# Patient Record
Sex: MALE | Race: WHITE | NOT HISPANIC OR LATINO | Employment: OTHER | URBAN - NONMETROPOLITAN AREA
[De-identification: names, ages, dates, MRNs, and addresses within clinical notes are randomized per-mention and may not be internally consistent; named-entity substitution may affect disease eponyms.]

---

## 2018-06-13 ENCOUNTER — APPOINTMENT (OUTPATIENT)
Dept: LAB | Facility: HOSPITAL | Age: 63
End: 2018-06-13
Attending: INTERNAL MEDICINE

## 2018-06-13 ENCOUNTER — OFFICE VISIT (OUTPATIENT)
Dept: CARDIOLOGY | Facility: CLINIC | Age: 63
End: 2018-06-13

## 2018-06-13 VITALS
BODY MASS INDEX: 26.83 KG/M2 | HEART RATE: 51 BPM | OXYGEN SATURATION: 99 % | SYSTOLIC BLOOD PRESSURE: 132 MMHG | DIASTOLIC BLOOD PRESSURE: 76 MMHG | HEIGHT: 70 IN | WEIGHT: 187.4 LBS

## 2018-06-13 DIAGNOSIS — R06.02 SHORTNESS OF BREATH: ICD-10-CM

## 2018-06-13 DIAGNOSIS — I10 ESSENTIAL HYPERTENSION: ICD-10-CM

## 2018-06-13 DIAGNOSIS — R63.4 WEIGHT LOSS: ICD-10-CM

## 2018-06-13 DIAGNOSIS — R00.1 BRADYCARDIA: ICD-10-CM

## 2018-06-13 DIAGNOSIS — R00.2 PALPITATION: ICD-10-CM

## 2018-06-13 DIAGNOSIS — R55 SYNCOPE, UNSPECIFIED SYNCOPE TYPE: ICD-10-CM

## 2018-06-13 DIAGNOSIS — R53.83 OTHER FATIGUE: ICD-10-CM

## 2018-06-13 DIAGNOSIS — R07.2 PRECORDIAL PAIN: Primary | ICD-10-CM

## 2018-06-13 PROCEDURE — 84479 ASSAY OF THYROID (T3 OR T4): CPT | Performed by: INTERNAL MEDICINE

## 2018-06-13 PROCEDURE — 99205 OFFICE O/P NEW HI 60 MIN: CPT | Performed by: INTERNAL MEDICINE

## 2018-06-13 PROCEDURE — 93000 ELECTROCARDIOGRAM COMPLETE: CPT | Performed by: INTERNAL MEDICINE

## 2018-06-13 PROCEDURE — 84443 ASSAY THYROID STIM HORMONE: CPT | Performed by: INTERNAL MEDICINE

## 2018-06-13 PROCEDURE — 80048 BASIC METABOLIC PNL TOTAL CA: CPT | Performed by: INTERNAL MEDICINE

## 2018-06-13 PROCEDURE — 84436 ASSAY OF TOTAL THYROXINE: CPT | Performed by: INTERNAL MEDICINE

## 2018-06-13 RX ORDER — NITROGLYCERIN 0.4 MG/1
TABLET SUBLINGUAL
Qty: 25 TABLET | Refills: 6 | Status: SHIPPED | OUTPATIENT
Start: 2018-06-13

## 2018-06-13 RX ORDER — ATORVASTATIN CALCIUM 40 MG/1
40 TABLET, FILM COATED ORAL DAILY
Qty: 30 TABLET | Refills: 11 | Status: SHIPPED | OUTPATIENT
Start: 2018-06-13 | End: 2022-04-14

## 2018-06-13 RX ORDER — AMLODIPINE BESYLATE 2.5 MG/1
2.5 TABLET ORAL DAILY
Qty: 30 TABLET | Refills: 6 | Status: SHIPPED | OUTPATIENT
Start: 2018-06-13 | End: 2018-08-08 | Stop reason: ALTCHOICE

## 2018-06-13 RX ORDER — ESCITALOPRAM OXALATE 20 MG/1
20 TABLET ORAL DAILY
Refills: 2 | COMMUNITY
Start: 2018-03-16

## 2018-06-13 RX ORDER — RANITIDINE 300 MG/1
300 TABLET ORAL 2 TIMES DAILY
Refills: 2 | COMMUNITY
Start: 2018-03-16 | End: 2018-08-08 | Stop reason: ALTCHOICE

## 2018-06-13 RX ORDER — LOSARTAN POTASSIUM 100 MG/1
100 TABLET ORAL DAILY
COMMUNITY
Start: 2018-06-12

## 2018-06-13 NOTE — PROGRESS NOTES
"Subjective   Alfredo Lindsey is a 62 y.o. male     Chief Complaint   Patient presents with   • Chest Pain     Presents to establish care.    • Shortness of Breath   • Palpitations   • Hypertension       PROBLEM LIST:     Specialty Problems     None            HPI: Problem List:  1. Chest Pain.  2. Shortness of Breath.  3. Hypertension.  4. Palpitations.  5. Fatigue.       Mr. Alfredo Lindsey is a 62-year-old white male referred by Dr. Mike Jackson for evaluation of chest pain.    Mr. Lindsey describes retrosternal and bilateral precordial chest \"tightness\".  Symptoms tend to occur with physical activity are accompanied by shortness of breath and a sense of air hunger, and tightness radiates into the left side of the neck and and to the left arm.  Dyspnea is reliably associated with these symptoms, occasionally the patient is diaphoretic and nauseated as well.  Symptoms last for several minutes to a half hour or more.  They are always self-limited resolving with rest although Mr. Lindsey states that he has had occasional symptoms not associated with physical activity.    Chest discomfort began 6-12 months ago and is becoming progressively more frequent although not necessarily more severe or precipitated by lower levels of physical activity over that time.  He describes episodes of orthopnea and PND over the same time.  Although he has not had nocturnal awakening of late.    Mr. Lindsey also describes syncope.  He has had at least one episode of orthostatic syncope, and one episode of post micturition syncope.  He has no syncope associated with abnormal heart rhythms or with chest pain.  He denies any symptoms of peripheral arterial disease or of arterial embolic events.  He does describe other worrisome symptoms however.  Mr. Lindsey states that he vomits airy frequently after eating only a single bite of food.  He lost 70 pounds over a 12 month period and is gaining 20 pounds back.  He relates that he has had " gross hematemesis as well as hematochezia.  He is scheduled for panendoscopy in the near future.  He also describes intermittent night sweats and generalized anergy, fatigue, and decreased exercise capacity.  CURRENT MEDICATION:    Current Outpatient Prescriptions   Medication Sig Dispense Refill   • escitalopram (LEXAPRO) 10 MG tablet Take 10 mg by mouth Daily.  2   • losartan (COZAAR) 100 MG tablet Take 100 mg by mouth Daily.     • raNITIdine (ZANTAC) 300 MG tablet Take 300 mg by mouth 2 (Two) Times a Day.  2     No current facility-administered medications for this visit.        ALLERGIES:    Patient has no known allergies.    PAST MEDICAL HISTORY:    History reviewed. No pertinent past medical history.    SURGICAL HISTORY:    History reviewed. No pertinent surgical history.    SOCIAL HISTORY:    Social History     Social History   • Marital status:      Spouse name: N/A   • Number of children: N/A   • Years of education: N/A     Occupational History   • Not on file.     Social History Main Topics   • Smoking status: Never Smoker   • Smokeless tobacco: Never Used   • Alcohol use No   • Drug use: No   • Sexual activity: Defer     Other Topics Concern   • Not on file     Social History Narrative   • No narrative on file       FAMILY HISTORY:    History reviewed. No pertinent family history.    Review of Systems   Constitutional: Positive for diaphoresis (Night Sweats) and fatigue (Onset 2 months ago- worsened). Negative for chills and fever.   HENT: Negative.    Eyes: Positive for visual disturbance (Wears glasses).   Respiratory: Positive for shortness of breath (With increased activity).    Cardiovascular: Positive for chest pain (Intermittent. Onset 1 month ago. Midsternal with radiation to left neck and back. ) and palpitations. Negative for leg swelling.   Gastrointestinal: Positive for abdominal pain, blood in stool (2 months ago. Seeing GI and scheduled for EGD and Colonoscopy on 7/20/18) and vomiting  "(Vomiting after eating). Negative for anal bleeding and nausea.   Endocrine: Negative.    Genitourinary: Negative.  Negative for hematuria.   Musculoskeletal: Positive for arthralgias (Chronic) and myalgias (To BLE).   Skin: Negative.    Allergic/Immunologic: Negative.    Neurological: Positive for dizziness (When standing), syncope (Last episode 2 months ago) and weakness (Generalized). Negative for light-headedness and headaches.   Hematological: Negative.  Does not bruise/bleed easily.   Psychiatric/Behavioral: Positive for sleep disturbance (Wakes frequently SOA throughout the night. Has to get up out of bed and sit under a fan to catch breath. ).       VISIT VITALS:  Vitals:    06/13/18 0927   BP: 132/76   BP Location: Left arm   Patient Position: Sitting   Pulse: 51   SpO2: 99%   Weight: 85 kg (187 lb 6.4 oz)   Height: 177.8 cm (70\")      /76 (BP Location: Left arm, Patient Position: Sitting)   Pulse 51   Ht 177.8 cm (70\")   Wt 85 kg (187 lb 6.4 oz)   SpO2 99%   BMI 26.89 kg/m²     RECENT LABS:    Objective       Physical Exam   Constitutional: He appears well-developed and well-nourished. He is cooperative.   HENT:   Head: Normocephalic and atraumatic.   Eyes: Conjunctivae, EOM and lids are normal. Pupils are equal, round, and reactive to light.   Mild Lid Lag.    Myosis.   Neck: Normal range of motion and full passive range of motion without pain. Neck supple. Normal carotid pulses, no hepatojugular reflux and no JVD present. Carotid bruit is not present. No thyromegaly present.   Cardiovascular: Regular rhythm, S1 normal, S2 normal, intact distal pulses and normal pulses.  Bradycardia present.  Exam reveals gallop and S4 (Soft). Exam reveals no friction rub.    No murmur heard.  Pulses:       Carotid pulses are 2+ on the right side, and 2+ on the left side.       Radial pulses are 2+ on the right side, and 2+ on the left side.        Dorsalis pedis pulses are 2+ on the right side, and 2+ on the " left side.        Posterior tibial pulses are 2+ on the right side, and 2+ on the left side.   Pulmonary/Chest: Effort normal and breath sounds normal. No respiratory distress.   Normal Expiratory Phase.    Abdominal: Soft. Normal appearance and bowel sounds are normal. He exhibits no distension, no abdominal bruit and no mass. There is no hepatomegaly. There is tenderness in the left upper quadrant.   Negative Organomegally   Musculoskeletal: Normal range of motion. He exhibits no edema.   Neurological: He is alert.   Skin: Skin is warm, dry and intact.   Nursing note and vitals reviewed.        ECG 12 Lead  Date/Time: 6/13/2018 9:37 AM  Performed by: JEREMY PERALTA  Authorized by: JEREMY PERALTA   Rhythm: sinus bradycardia  Comments: NST              Assessment/Plan   #1.  Chest pain.  The patient describes features compatible with ischemia which have been gradually progressive over at least 3-6 months.  Empiric therapy and expedited diagnosis of therefore indicated.    #2.  Therefore, we will schedule the patient for a treadmill Cardiolite stress testing and echocardiogram on an expedited basis as for ischemia nonischemic causes of chest discomfort.    #3.  I would like Mr. Lindsey to wear a 30 day event monitor to exclude a dysrhythmic cause of his syncope, and also to look for chronotropic incompetence (which should also be evaluated by treadmill stress testing) as a cause of his increased exertional dyspnea decreased exercise capacity and overall fatigue.    #4.  To further evaluate symptoms as described above, we will obtain a 24-hour urine for 5 prime HIAA as several of the patient's GI symptoms are suggestive of carcinoid syndrome.  Check thyroid function studies and basic metabolic panel.    #5.  Empirically, we will start atorvastatin 40 mg daily, add amlodipine 2.5 mg daily (the patient was given precautions reference worsening of orthostatic lightheadedness and edema) as I don't think that beta  blockers a viable option given his resting bradycardia.  Mr. Lindsey will also be given a prescription for sublingual nitroglycerin and he was given instructions in its use.  The patient was also given instructions to report immediately to the emergency room are activated emergency medical services for any chest discomfort not rapidly relieved by nitroglycerin.    #6.  César will follow-up for panendoscopy and with Dr. Jackson as instructed, and in our office immediately after testing or on a when necessary basis as discussed in detail with the patient today.  No diagnosis found.    No Follow-up on file.              Patient's Body mass index is 26.89 kg/m². BMI is above normal parameters. Recommendations include: educational material.       Emely Lopez LPN      Scribed for Dr. Alfredo Mcclellan by Emely Lopez LPN June 13, 2018 10:11 AM   Electronically signed by:            This note is dictated utilizing voice recognition software.  Although this record has been proof read, transcriptional errors may still be present. If questions occur regarding the content of this record please do not hesitate to call our office.

## 2018-06-14 LAB
ANION GAP SERPL CALCULATED.3IONS-SCNC: 5.8 MMOL/L (ref 3.6–11.2)
BUN BLD-MCNC: 18 MG/DL (ref 7–21)
BUN/CREAT SERPL: 14 (ref 7–25)
CALCIUM SPEC-SCNC: 9.2 MG/DL (ref 7.7–10)
CHLORIDE SERPL-SCNC: 106 MMOL/L (ref 99–112)
CO2 SERPL-SCNC: 26.2 MMOL/L (ref 24.3–31.9)
CREAT BLD-MCNC: 1.29 MG/DL (ref 0.43–1.29)
DEPRECATED FTI SERPL-MCNC: 2.5 TBI
GFR SERPL CREATININE-BSD FRML MDRD: 56 ML/MIN/1.73
GLUCOSE BLD-MCNC: 97 MG/DL (ref 70–110)
OSMOLALITY SERPL CALC.SUM OF ELEC: 277.5 MOSM/KG (ref 273–305)
POTASSIUM BLD-SCNC: 4.6 MMOL/L (ref 3.5–5.3)
SODIUM BLD-SCNC: 138 MMOL/L (ref 135–153)
T3RU NFR SERPL: 31.7 % (ref 22.5–37)
T4 SERPL-MCNC: 8 MCG/DL (ref 4.5–10.9)
TSH SERPL DL<=0.05 MIU/L-ACNC: 1.05 MIU/ML (ref 0.55–4.78)

## 2018-06-18 ENCOUNTER — APPOINTMENT (OUTPATIENT)
Dept: LAB | Facility: HOSPITAL | Age: 63
End: 2018-06-18
Attending: INTERNAL MEDICINE

## 2018-06-18 PROCEDURE — 83497 ASSAY OF 5-HIAA: CPT | Performed by: INTERNAL MEDICINE

## 2018-06-18 PROCEDURE — 81050 URINALYSIS VOLUME MEASURE: CPT | Performed by: INTERNAL MEDICINE

## 2018-07-05 ENCOUNTER — OUTSIDE FACILITY SERVICE (OUTPATIENT)
Dept: CARDIOLOGY | Facility: CLINIC | Age: 63
End: 2018-07-05

## 2018-07-05 ENCOUNTER — HOSPITAL ENCOUNTER (OUTPATIENT)
Dept: CARDIOLOGY | Facility: HOSPITAL | Age: 63
Discharge: HOME | End: 2018-07-05
Attending: INTERNAL MEDICINE

## 2018-07-05 LAB
MAXIMAL PREDICTED HEART RATE: 157 BPM
MAXIMAL PREDICTED HEART RATE: 157 BPM
STRESS TARGET HR: 133 BPM
STRESS TARGET HR: 133 BPM

## 2018-07-05 PROCEDURE — 93306 TTE W/DOPPLER COMPLETE: CPT | Performed by: INTERNAL MEDICINE

## 2018-07-05 PROCEDURE — 93018 CV STRESS TEST I&R ONLY: CPT | Performed by: INTERNAL MEDICINE

## 2018-07-05 PROCEDURE — 0 TECHNETIUM SESTAMIBI: Performed by: INTERNAL MEDICINE

## 2018-07-05 PROCEDURE — A9500 TC99M SESTAMIBI: HCPCS | Performed by: INTERNAL MEDICINE

## 2018-07-05 PROCEDURE — 78452 HT MUSCLE IMAGE SPECT MULT: CPT

## 2018-07-05 PROCEDURE — 93017 CV STRESS TEST TRACING ONLY: CPT

## 2018-07-05 PROCEDURE — 93306 TTE W/DOPPLER COMPLETE: CPT

## 2018-07-05 PROCEDURE — 78452 HT MUSCLE IMAGE SPECT MULT: CPT | Performed by: INTERNAL MEDICINE

## 2018-07-05 RX ADMIN — TECHNETIUM TC 99M SESTAMIBI 1 DOSE: 1 INJECTION INTRAVENOUS at 08:30

## 2018-07-11 ENCOUNTER — DOCUMENTATION (OUTPATIENT)
Dept: CARDIOLOGY | Facility: CLINIC | Age: 63
End: 2018-07-11

## 2018-07-16 ENCOUNTER — APPOINTMENT (OUTPATIENT)
Dept: CARDIOLOGY | Facility: HOSPITAL | Age: 63
End: 2018-07-16
Attending: INTERNAL MEDICINE

## 2018-08-08 ENCOUNTER — OFFICE VISIT (OUTPATIENT)
Dept: CARDIOLOGY | Facility: CLINIC | Age: 63
End: 2018-08-08

## 2018-08-08 VITALS
BODY MASS INDEX: 26.2 KG/M2 | DIASTOLIC BLOOD PRESSURE: 94 MMHG | HEART RATE: 64 BPM | HEIGHT: 70 IN | OXYGEN SATURATION: 99 % | WEIGHT: 183 LBS | SYSTOLIC BLOOD PRESSURE: 169 MMHG

## 2018-08-08 DIAGNOSIS — R06.02 SHORTNESS OF BREATH: ICD-10-CM

## 2018-08-08 DIAGNOSIS — R53.83 OTHER FATIGUE: ICD-10-CM

## 2018-08-08 DIAGNOSIS — I10 ESSENTIAL HYPERTENSION: ICD-10-CM

## 2018-08-08 DIAGNOSIS — R07.2 PRECORDIAL PAIN: ICD-10-CM

## 2018-08-08 DIAGNOSIS — N20.0 KIDNEY STONES: ICD-10-CM

## 2018-08-08 DIAGNOSIS — R42 DIZZINESS: ICD-10-CM

## 2018-08-08 DIAGNOSIS — A04.8 H. PYLORI INFECTION: ICD-10-CM

## 2018-08-08 DIAGNOSIS — R00.2 PALPITATION: Primary | ICD-10-CM

## 2018-08-08 PROCEDURE — 99214 OFFICE O/P EST MOD 30 MIN: CPT | Performed by: INTERNAL MEDICINE

## 2018-08-08 RX ORDER — OMEPRAZOLE 20 MG/1
CAPSULE, DELAYED RELEASE ORAL DAILY
COMMUNITY
Start: 2018-07-09 | End: 2022-04-14

## 2018-08-08 RX ORDER — DILTIAZEM HYDROCHLORIDE 180 MG/1
180 CAPSULE, COATED, EXTENDED RELEASE ORAL DAILY
Qty: 30 CAPSULE | Refills: 5 | Status: SHIPPED | OUTPATIENT
Start: 2018-08-08 | End: 2019-02-12 | Stop reason: SDUPTHER

## 2018-08-08 NOTE — PROGRESS NOTES
Subjective   Alfredo Lindsey is a 62 y.o. male     Chief Complaint   Patient presents with   • Hypertension     Here for f/u on testing   • Palpitations       PROBLEM LIST:     Problem List:  1. Chest Pain.  1.1 Stress test 7-5-18, low risk and no ischemia  2. Shortness of Breath.  3. Hypertension.  3.1 Echo 7-5-18, Ef 60-65%, mild Mr, Mild TR, pulm. Pressures 25-30 mmHg, no pericardial effusion  4. Palpitations.  5. Fatigue.         Specialty Problems        Cardiology Problems    Essential hypertension        Palpitation                HPI:  Mr. Lindsey returns for follow-up on testing after initial evaluation of chest pain and dyspnea.  The patient also related an unexplained 70 pound weight loss.  Initial presenting chest discomfort was felt eventually compatible with ischemia.    Mr. Lindsey underwent treadmill stress testing.  He demonstrated very good to excellent exercise capacity with no reproduction of chest pain, no EKG changes, and no scintigraphic evidence of ischemia.  LV systolic function was preserved.    Echocardiogram demonstrated preserved LV systolic function, only mild diastolic dysfunction, and no significant valve, pericardial, or great vessel disease.  There is no pericardial effusion and no pulmonary hypertension was identified.    Labs were unremarkable.    Mr. Lindsey underwent GI evaluation which documented, per his report, severe gastritis H. pylori positive.  He was also found to have sizable renal stones.  He had some improvement in chest discomfort with treatment reference that pathology.  He has tolerated atorvastatin significant arthralgias or myalgias.        CURRENT MEDICATION:    Current Outpatient Prescriptions   Medication Sig Dispense Refill   • amLODIPine (NORVASC) 2.5 MG tablet Take 1 tablet by mouth Daily. 30 tablet 6   • aspirin 81 MG tablet Take 1 tablet by mouth Daily. 30 tablet 11   • atorvastatin (LIPITOR) 40 MG tablet Take 1 tablet by mouth Daily. 30 tablet 11   •  escitalopram (LEXAPRO) 10 MG tablet Take 10 mg by mouth Daily.  2   • losartan (COZAAR) 100 MG tablet Take 100 mg by mouth Daily.     • omeprazole (priLOSEC) 20 MG capsule Daily.     • nitroglycerin (NITROSTAT) 0.4 MG SL tablet 1 under the tongue as needed for angina, may repeat q5mins for up three doses 25 tablet 6     No current facility-administered medications for this visit.        ALLERGIES:    Patient has no known allergies.    PAST MEDICAL HISTORY:    Past Medical History:   Diagnosis Date   • H. pylori infection    • Hypertension    • Kidney stones        SURGICAL HISTORY:    Past Surgical History:   Procedure Laterality Date   • CYSTOSCOPY BLADDER STONE LITHOTRIPSY     • ENDOSCOPY AND COLONOSCOPY     • RETINAL DETACHMENT REPAIR         SOCIAL HISTORY:    Social History     Social History   • Marital status:      Spouse name: N/A   • Number of children: N/A   • Years of education: N/A     Occupational History   • Not on file.     Social History Main Topics   • Smoking status: Never Smoker   • Smokeless tobacco: Never Used   • Alcohol use No   • Drug use: No   • Sexual activity: Defer     Other Topics Concern   • Not on file     Social History Narrative   • No narrative on file       FAMILY HISTORY:    Family History   Problem Relation Age of Onset   • Cancer Mother    • Heart disease Mother    • Heart failure Mother    • Hypertension Mother    • Hyperlipidemia Mother    • Heart disease Father        Review of Systems   Constitutional: Positive for fatigue.   HENT: Negative.    Eyes: Positive for visual disturbance (glasses prn).   Respiratory: Negative.    Cardiovascular: Positive for chest pain (couple episodes) and palpitations. Negative for leg swelling.   Gastrointestinal: Positive for abdominal pain (recent H. Pylori infection), nausea and vomiting. Negative for blood in stool (no melena,hematochezia,hematuria,hemoptysis).   Genitourinary: Positive for decreased urine volume.        Currently  "with x3 stones in place, follwed by Dr. Yoon and Dr. Mcpherson   Musculoskeletal: Positive for arthralgias and myalgias.   Skin: Negative.    Allergic/Immunologic: Positive for environmental allergies.   Neurological: Positive for dizziness.   Hematological: Negative.    Psychiatric/Behavioral: Positive for sleep disturbance.       VISIT VITALS:  Vitals:    08/08/18 1052   BP: 169/94   BP Location: Left arm   Patient Position: Sitting   Pulse: 64   SpO2: 99%   Weight: 83 kg (183 lb)   Height: 177.8 cm (70\")      /94 (BP Location: Left arm, Patient Position: Sitting)   Pulse 64   Ht 177.8 cm (70\")   Wt 83 kg (183 lb)   SpO2 99%   BMI 26.26 kg/m²     RECENT LABS:    Objective       Physical Exam    Procedures      Assessment/Plan   #1.  Chest pain.  The patient initially described chest discomfort very worrisome for angina.  However, he demonstrated very good exercise capacity on treadmill stress testing without symptoms, EKG changes, or scintigraphic evidence of ischemia.  Particularly given that there is a potential alternate etiology for his chest discomfort, I don't think that further evaluation for ischemia is indicated at this time.    #2.  Blood pressures remained suboptimally controlled.  I'm concerned that increasing amlodipine will worsen orthostatic hypotension.  Therefore we will stop that medicine Itrel diltiazem 180 mg daily up titrated to tolerance and effect.    #3.  Syncope.  The patient describes a single episode of post micturition syncope and one episode of orthostatic syncope.  He has been able to compensate for orthostatic dizziness and has had no recent presyncopal events.  Although thirty-day event monitor was not performed, I am not sure that that study needs to be reordered at this time given the negative studies described above, and with obvious precipitating factors for both events.  Therefore, we will continue close clinical monitoring only for the present.    #4.  Mr. Lindsey " will follow-up with Dr. Jackson per his instructions, and in our office in 6 months or on a when necessary basis for medication intolerance, blood pressures, worsening of orthostatic symptoms etc. as discussed in detail today   Diagnosis Plan   1. Palpitation     2. Essential hypertension     3. Shortness of breath     4. Precordial pain     5. Other fatigue     6. Kidney stones     7. H. pylori infection     8.      Dizziness    No Follow-up on file.            Patient's Body mass index is 26.26 kg/m². BMI is above normal parameters. Recommendations include: educational material and referral to primary care.       Ashlie Luna LPN    Scribed for Dr. Alfredo Mcclellan by Ashlie Luna LPN August 8, 2018 11:44 AM         Electronically signed by:            This note is dictated utilizing voice recognition software.  Although this record has been proof read, transcriptional errors may still be present. If questions occur regarding the content of this record please do not hesitate to call our office.

## 2018-08-08 NOTE — PATIENT INSTRUCTIONS
Obesity, Adult  Obesity is the condition of having too much total body fat. Being overweight or obese means that your weight is greater than what is considered healthy for your body size. Obesity is determined by a measurement called BMI. BMI is an estimate of body fat and is calculated from height and weight. For adults, a BMI of 30 or higher is considered obese.  Obesity can eventually lead to other health concerns and major illnesses, including:  · Stroke.  · Coronary artery disease (CAD).  · Type 2 diabetes.  · Some types of cancer, including cancers of the colon, breast, uterus, and gallbladder.  · Osteoarthritis.  · High blood pressure (hypertension).  · High cholesterol.  · Sleep apnea.  · Gallbladder stones.  · Infertility problems.    What are the causes?  The main cause of obesity is taking in (consuming) more calories than your body uses for energy. Other factors that contribute to this condition may include:  · Being born with genes that make you more likely to become obese.  · Having a medical condition that causes obesity. These conditions include:  ? Hypothyroidism.  ? Polycystic ovarian syndrome (PCOS).  ? Binge-eating disorder.  ? Cushing syndrome.  · Taking certain medicines, such as steroids, antidepressants, and seizure medicines.  · Not being physically active (sedentary lifestyle).  · Living where there are limited places to exercise safely or buy healthy foods.  · Not getting enough sleep.    What increases the risk?  The following factors may increase your risk of this condition:  · Having a family history of obesity.  · Being a woman of -American descent.  · Being a man of  descent.    What are the signs or symptoms?  Having excessive body fat is the main symptom of this condition.  How is this diagnosed?  This condition may be diagnosed based on:  · Your symptoms.  · Your medical history.  · A physical exam. Your health care provider may measure:  ? Your BMI. If you are an  adult with a BMI between 25 and less than 30, you are considered overweight. If you are an adult with a BMI of 30 or higher, you are considered obese.  ? The distances around your hips and your waist (circumferences). These may be compared to each other to help diagnose your condition.  ? Your skinfold thickness. Your health care provider may gently pinch a fold of your skin and measure it.    How is this treated?  Treatment for this condition often includes changing your lifestyle. Treatment may include some or all of the following:  · Dietary changes. Work with your health care provider and a dietitian to set a weight-loss goal that is healthy and reasonable for you. Dietary changes may include eating:  ? Smaller portions. A portion size is the amount of a particular food that is healthy for you to eat at one time. This varies from person to person.  ? Low-calorie or low-fat options.  ? More whole grains, fruits, and vegetables.  · Regular physical activity. This may include aerobic activity (cardio) and strength training.  · Medicine to help you lose weight. Your health care provider may prescribe medicine if you are unable to lose 1 pound a week after 6 weeks of eating more healthily and doing more physical activity.  · Surgery. Surgical options may include gastric banding and gastric bypass. Surgery may be done if:  ? Other treatments have not helped to improve your condition.  ? You have a BMI of 40 or higher.  ? You have life-threatening health problems related to obesity.    Follow these instructions at home:    Eating and drinking    · Follow recommendations from your health care provider about what you eat and drink. Your health care provider may advise you to:  ? Limit fast foods, sweets, and processed snack foods.  ? Choose low-fat options, such as low-fat milk instead of whole milk.  ? Eat 5 or more servings of fruits or vegetables every day.  ? Eat at home more often. This gives you more control over  what you eat.  ? Choose healthy foods when you eat out.  ? Learn what a healthy portion size is.  ? Keep low-fat snacks on hand.  ? Avoid sugary drinks, such as soda, fruit juice, iced tea sweetened with sugar, and flavored milk.  ? Eat a healthy breakfast.  · Drink enough water to keep your urine clear or pale yellow.  · Do not go without eating for long periods of time (do not fast) or follow a fad diet. Fasting and fad diets can be unhealthy and even dangerous.  Physical Activity  · Exercise regularly, as told by your health care provider. Ask your health care provider what types of exercise are safe for you and how often you should exercise.  · Warm up and stretch before being active.  · Cool down and stretch after being active.  · Rest between periods of activity.  Lifestyle  · Limit the time that you spend in front of your TV, computer, or video game system.  · Find ways to reward yourself that do not involve food.  · Limit alcohol intake to no more than 1 drink a day for nonpregnant women and 2 drinks a day for men. One drink equals 12 oz of beer, 5 oz of wine, or 1½ oz of hard liquor.  General instructions  · Keep a weight loss journal to keep track of the food you eat and how much you exercise you get.  · Take over-the-counter and prescription medicines only as told by your health care provider.  · Take vitamins and supplements only as told by your health care provider.  · Consider joining a support group. Your health care provider may be able to recommend a support group.  · Keep all follow-up visits as told by your health care provider. This is important.  Contact a health care provider if:  · You are unable to meet your weight loss goal after 6 weeks of dietary and lifestyle changes.  This information is not intended to replace advice given to you by your health care provider. Make sure you discuss any questions you have with your health care provider.  Document Released: 01/25/2006 Document Revised:  05/22/2017 Document Reviewed: 10/05/2016  [employer] Interactive Patient Education © 2018 [employer]  MyPlate from [employer]  The general, healthful diet is based on the 2010 Dietary Guidelines for Americans. The amount of food you need to eat from each food group depends on your age, sex, and level of physical activity and can be individualized by a dietitian. Go to ChooseMyPlate.gov for more information.  What do I need to know about the MyPlate plan?  · Enjoy your food, but eat less.  · Avoid oversized portions.  ? ½ of your plate should include fruits and vegetables.  ? ¼ of your plate should be grains.  ? ¼ of your plate should be protein.  Grains  · Make at least half of your grains whole grains.  · For a 2,000 calorie daily food plan, eat 6 oz every day.  · 1 oz is about 1 slice bread, 1 cup cereal, or ½ cup cooked rice, cereal, or pasta.  Vegetables  · Make half your plate fruits and vegetables.  · For a 2,000 calorie daily food plan, eat 2½ cups every day.  · 1 cup is about 1 cup raw or cooked vegetables or vegetable juice or 2 cups raw leafy greens.  Fruits  · Make half your plate fruits and vegetables.  · For a 2,000 calorie daily food plan, eat 2 cups every day.  · 1 cup is about 1 cup fruit or 100% fruit juice or ½ cup dried fruit.  Protein  · For a 2,000 calorie daily food plan, eat 5½ oz every day.  · 1 oz is about 1 oz meat, poultry, or fish, ¼ cup cooked beans, 1 egg, 1 Tbsp peanut butter, or ½ oz nuts or seeds.  Dairy  · Switch to fat-free or low-fat (1%) milk.  · For a 2,000 calorie daily food plan, eat 3 cups every day.  · 1 cup is about 1 cup milk or yogurt or soy milk (soy beverage), 1½ oz natural cheese, or 2 oz processed cheese.  Fats, Oils, and Empty Calories  · Only small amounts of oils are recommended.  · Empty calories are calories from solid fats or added sugars.  · Compare sodium in foods like soup, bread, and frozen meals. Choose the foods with lower numbers.  · Drink water instead of  sugary drinks.  What foods can I eat?  Grains  Whole grains such as whole wheat, quinoa, millet, and bulgur. Bread, rolls, and pasta made from whole grains. Brown or wild rice. Hot or cold cereals made from whole grains and without added sugar.  Vegetables  All fresh vegetables, especially fresh red, dark green, or orange vegetables. Peas and beans. Low-sodium frozen or canned vegetables prepared without added salt. Low-sodium vegetable juices.  Fruits  All fresh, frozen, and dried fruits. Canned fruit packed in water or fruit juice without added sugar. Fruit juices without added sugar.  Meats and Other Protein Sources  Boiled, baked, or grilled lean meat trimmed of fat. Skinless poultry. Fresh seafood and shellfish. Canned seafood packed in water. Unsalted nuts and unsalted nut butters. Tofu. Dried beans and pea. Eggs.  Dairy  Low-fat or fat-free milk, yogurt, and cheeses.  Sweets and Desserts  Frozen desserts made from low-fat milk.  Fats and Oils  Olive, peanut, and canola oils and margarine. Salad dressing and mayonnaise made from these oils.  Other  Soups and casseroles made from allowed ingredients and without added fat or salt.  The items listed above may not be a complete list of recommended foods or beverages. Contact your dietitian for more options.  What foods are not recommended?  Grains  Sweetened, low-fiber cereals. Packaged baked goods. Snack crackers and chips. Cheese crackers, butter crackers, and biscuits. Frozen waffles, sweet breads, doughnuts, pastries, packaged baking mixes, pancakes, cakes, and cookies.  Vegetables  Regular canned or frozen vegetables or vegetables prepared with salt. Canned tomatoes. Canned tomato sauce. Fried vegetables. Vegetables in cream sauce or cheese sauce.  Fruits  Fruits packed in syrup or made with added sugar.  Meats and Other Protein Sources  Marbled or fatty meats such as ribs. Poultry with skin. Fried meats, poultry, eggs, or fish. Sausages, hot dogs, and deli  meats such as pastrami, bologna, or salami.  Dairy  Whole milk, cream, cheeses made from whole milk, sour cream. Ice cream or yogurt made from whole milk or with added sugar.  Beverages  For adults, no more than one alcoholic drink per day. Regular soft drinks or other sugary beverages. Juice drinks.  Sweets and Desserts  Sugary or fatty desserts, candy, and other sweets.  Fats and Oils  Solid shortening or partially hydrogenated oils. Solid margarine. Margarine that contains trans fats. Butter.  The items listed above may not be a complete list of foods and beverages to avoid. Contact your dietitian for more information.  This information is not intended to replace advice given to you by your health care provider. Make sure you discuss any questions you have with your health care provider.  Document Released: 01/06/2009 Document Revised: 05/25/2017 Document Reviewed: 11/26/2014  [employer] Interactive Patient Education © 2018 [employer]

## 2018-08-13 ENCOUNTER — DOCUMENTATION (OUTPATIENT)
Dept: CARDIOLOGY | Facility: CLINIC | Age: 63
End: 2018-08-13

## 2019-02-12 ENCOUNTER — OFFICE VISIT (OUTPATIENT)
Dept: CARDIOLOGY | Facility: CLINIC | Age: 64
End: 2019-02-12

## 2019-02-12 VITALS
OXYGEN SATURATION: 99 % | WEIGHT: 191.8 LBS | BODY MASS INDEX: 27.46 KG/M2 | HEIGHT: 70 IN | DIASTOLIC BLOOD PRESSURE: 95 MMHG | HEART RATE: 67 BPM | SYSTOLIC BLOOD PRESSURE: 157 MMHG

## 2019-02-12 DIAGNOSIS — R06.02 SHORTNESS OF BREATH: ICD-10-CM

## 2019-02-12 DIAGNOSIS — R53.83 OTHER FATIGUE: ICD-10-CM

## 2019-02-12 DIAGNOSIS — R00.2 PALPITATION: ICD-10-CM

## 2019-02-12 DIAGNOSIS — I10 ESSENTIAL HYPERTENSION: Primary | ICD-10-CM

## 2019-02-12 DIAGNOSIS — R07.2 PRECORDIAL PAIN: ICD-10-CM

## 2019-02-12 DIAGNOSIS — R42 DIZZINESS: ICD-10-CM

## 2019-02-12 PROCEDURE — 99213 OFFICE O/P EST LOW 20 MIN: CPT | Performed by: INTERNAL MEDICINE

## 2019-02-12 RX ORDER — DILTIAZEM HYDROCHLORIDE 240 MG/1
240 CAPSULE, COATED, EXTENDED RELEASE ORAL DAILY
Qty: 30 CAPSULE | Refills: 11 | Status: SHIPPED | OUTPATIENT
Start: 2019-02-12

## 2019-02-12 NOTE — PROGRESS NOTES
Subjective   Alfredo Lindsey is a 63 y.o. male     Chief Complaint   Patient presents with   • Hypertension     Here for 6 mo. f/u   • Palpitations       PROBLEM LIST:       1. Chest Pain.  1.1 Stress test 7-5-18, low risk and no ischemia  2. Shortness of Breath.  3. Hypertension.  3.1 Echo 7-5-18, Ef 60-65%, mild Mr, Mild TR, pulm. Pressures 25-30 mmHg, no pericardial effusion  4. Palpitations.  5. Fatigue.             Specialty Problems        Cardiology Problems    Essential hypertension        Palpitation                HPI:  Mr. Lindsey returns for follow-up on the above problems and to assess response to therapy.    With changing amlodipine to diltiazem he has had significant improvement in his orthostatic dizziness.  He has been treated repeatedly for H. pylori infestation and feels chronically weak and fatigued, he believes, for medications.  His chest pain is improved significantly and he has much less shortness of breath which seems to relate to episodes of heartburn.  Mr. Lindsey is made [place] modification to improve both his eye symptoms and has been trying to cut back on his work schedule.  Graft the patient continues to be without symptoms of congestive heart failure, dysrhythmia, peripheral arterial disease, or arterial embolic events.  Blood pressures remained suboptimally controlled.                  CURRENT MEDICATION:    Current Outpatient Medications   Medication Sig Dispense Refill   • aspirin 81 MG tablet Take 1 tablet by mouth Daily. 30 tablet 11   • atorvastatin (LIPITOR) 40 MG tablet Take 1 tablet by mouth Daily. 30 tablet 11   • diltiaZEM CD (CARDIZEM CD) 180 MG 24 hr capsule Take 1 capsule by mouth Daily. 30 capsule 5   • escitalopram (LEXAPRO) 10 MG tablet Take 10 mg by mouth Daily.  2   • losartan (COZAAR) 100 MG tablet Take 100 mg by mouth Daily.     • omeprazole (priLOSEC) 20 MG capsule Daily.     • nitroglycerin (NITROSTAT) 0.4 MG SL tablet 1 under the tongue as needed for angina,  may repeat q5mins for up three doses 25 tablet 6     No current facility-administered medications for this visit.        ALLERGIES:    Patient has no known allergies.    PAST MEDICAL HISTORY:    Past Medical History:   Diagnosis Date   • H. pylori infection    • Hypertension    • Kidney stones        SURGICAL HISTORY:    Past Surgical History:   Procedure Laterality Date   • CYSTOSCOPY BLADDER STONE LITHOTRIPSY     • ENDOSCOPY AND COLONOSCOPY     • KIDNEY STONE SURGERY      removal   • RETINAL DETACHMENT REPAIR         SOCIAL HISTORY:    Social History     Socioeconomic History   • Marital status:      Spouse name: Not on file   • Number of children: Not on file   • Years of education: Not on file   • Highest education level: Not on file   Social Needs   • Financial resource strain: Not on file   • Food insecurity - worry: Not on file   • Food insecurity - inability: Not on file   • Transportation needs - medical: Not on file   • Transportation needs - non-medical: Not on file   Occupational History   • Not on file   Tobacco Use   • Smoking status: Never Smoker   • Smokeless tobacco: Never Used   Substance and Sexual Activity   • Alcohol use: No   • Drug use: No   • Sexual activity: Defer   Other Topics Concern   • Not on file   Social History Narrative   • Not on file       FAMILY HISTORY:    Family History   Problem Relation Age of Onset   • Cancer Mother    • Heart disease Mother    • Heart failure Mother    • Hypertension Mother    • Hyperlipidemia Mother    • Heart disease Father        Review of Systems   Constitutional: Positive for fatigue.   HENT: Positive for ear pain and sore throat.    Eyes: Positive for visual disturbance (glasses prn).   Respiratory: Negative.    Cardiovascular: Positive for palpitations (occas.). Negative for chest pain and leg swelling.   Gastrointestinal: Positive for abdominal pain. Negative for blood in stool (no melena,hematuria,hemoptysis,hematochezia).        Currently  "Tx'd with H. Pylori  Heartburn   Endocrine: Negative.    Genitourinary: Positive for decreased urine volume.   Musculoskeletal: Positive for arthralgias and myalgias.   Skin: Negative.    Allergic/Immunologic: Negative.    Neurological: Negative.    Hematological: Negative.    Psychiatric/Behavioral: Negative.        VISIT VITALS:  Vitals:    02/12/19 0836   BP: 157/95   BP Location: Left arm   Patient Position: Sitting   Pulse: 67   SpO2: 99%   Weight: 87 kg (191 lb 12.8 oz)   Height: 177.8 cm (70\")      /95 (BP Location: Left arm, Patient Position: Sitting)   Pulse 67   Ht 177.8 cm (70\")   Wt 87 kg (191 lb 12.8 oz)   SpO2 99%   BMI 27.52 kg/m²     RECENT LABS:    Objective       Physical Exam   Constitutional: He is oriented to person, place, and time. He appears well-developed and well-nourished. No distress.   HENT:   Head: Normocephalic and atraumatic.   Eyes: Conjunctivae and EOM are normal. Pupils are equal, round, and reactive to light.   Neck: Normal range of motion. Neck supple. No hepatojugular reflux and no JVD present. Carotid bruit is not present. No tracheal deviation present.   NL. Carotid upstrokes   Cardiovascular: Normal rate, regular rhythm, S1 normal, S2 normal and intact distal pulses. Exam reveals gallop and S4 (soft). Exam reveals no S3 and no friction rub.   No murmur heard.  Pulses:       Radial pulses are 2+ on the right side, and 2+ on the left side.   Pulmonary/Chest: Effort normal and breath sounds normal. He has no wheezes. He has no rhonchi. He has no rales.   NL. Expir. phase   Abdominal: Soft. Bowel sounds are normal. He exhibits no distension, no abdominal bruit and no mass. There is no tenderness. There is no rebound and no guarding.   No organomegaly   Musculoskeletal: Normal range of motion. He exhibits no edema, tenderness or deformity.   BLE, no edema, palpable pedal pulses     Neurological: He is alert and oriented to person, place, and time.   Skin: Skin is warm " and dry. No rash noted. No erythema. No pallor.   Psychiatric: He has a normal mood and affect. His behavior is normal. Judgment and thought content normal.   Nursing note and vitals reviewed.      Procedures      Assessment/Plan   #1..  This is improved and most likely relates to the patient's known H. pylori and GERD symptoms.  With low risk stress test findings I don't think that further evaluation for cardiac etiology is indicated at this point.    #2.  Syncope.  The patient had an episode of post micturition syncope as well as a single episode of orthostatic BP.  Orthostasis is significantly improved after changing to diltiazem.  His blood pressures are suboptimally controlled I would like to increase diltiazem to 240 mg daily although I don't think this will result in normalization of blood pressures.    #     Mr. Lindsey is scheduled to start Flomax.  I cautioned him reference recurrent orthostatic symptoms with that medication.    #4.  The patient will follow-up with Rosy Jackson and [facility] as instructed, and in our office in one year or on a when necessary basis for blood pressures, medication intolerance, or symptoms as discussed today.   Diagnosis Plan   1. Essential hypertension     2. Palpitation     3. Shortness of breath     4. Precordial pain     5. Other fatigue     6. Dizziness         No Follow-up on file.              Patient's Body mass index is 27.52 kg/m². BMI is above normal parameters. Recommendations include: educational material and referral to primary care.       Ashlie Luna LPN    Scribed for Dr. Alfredo Mcclellan by Ashlie Luna LPN February 12, 2019 9:15 AM         Electronically signed by:            This note is dictated utilizing voice recognition software.  Although this record has been proof read, transcriptional errors may still be present. If questions occur regarding the content of this record please do not hesitate to call our office.

## 2019-02-12 NOTE — PATIENT INSTRUCTIONS
Obesity, Adult  Obesity is the condition of having too much total body fat. Being overweight or obese means that your weight is greater than what is considered healthy for your body size. Obesity is determined by a measurement called BMI. BMI is an estimate of body fat and is calculated from height and weight. For adults, a BMI of 30 or higher is considered obese.  Obesity can eventually lead to other health concerns and major illnesses, including:  · Stroke.  · Coronary artery disease (CAD).  · Type 2 diabetes.  · Some types of cancer, including cancers of the colon, breast, uterus, and gallbladder.  · Osteoarthritis.  · High blood pressure (hypertension).  · High cholesterol.  · Sleep apnea.  · Gallbladder stones.  · Infertility problems.    What are the causes?  The main cause of obesity is taking in (consuming) more calories than your body uses for energy. Other factors that contribute to this condition may include:  · Being born with genes that make you more likely to become obese.  · Having a medical condition that causes obesity. These conditions include:  ? Hypothyroidism.  ? Polycystic ovarian syndrome (PCOS).  ? Binge-eating disorder.  ? Cushing syndrome.  · Taking certain medicines, such as steroids, antidepressants, and seizure medicines.  · Not being physically active (sedentary lifestyle).  · Living where there are limited places to exercise safely or buy healthy foods.  · Not getting enough sleep.    What increases the risk?  The following factors may increase your risk of this condition:  · Having a family history of obesity.  · Being a woman of -American descent.  · Being a man of  descent.    What are the signs or symptoms?  Having excessive body fat is the main symptom of this condition.  How is this diagnosed?  This condition may be diagnosed based on:  · Your symptoms.  · Your medical history.  · A physical exam. Your health care provider may measure:  ? Your BMI. If you are an  adult with a BMI between 25 and less than 30, you are considered overweight. If you are an adult with a BMI of 30 or higher, you are considered obese.  ? The distances around your hips and your waist (circumferences). These may be compared to each other to help diagnose your condition.  ? Your skinfold thickness. Your health care provider may gently pinch a fold of your skin and measure it.    How is this treated?  Treatment for this condition often includes changing your lifestyle. Treatment may include some or all of the following:  · Dietary changes. Work with your health care provider and a dietitian to set a weight-loss goal that is healthy and reasonable for you. Dietary changes may include eating:  ? Smaller portions. A portion size is the amount of a particular food that is healthy for you to eat at one time. This varies from person to person.  ? Low-calorie or low-fat options.  ? More whole grains, fruits, and vegetables.  · Regular physical activity. This may include aerobic activity (cardio) and strength training.  · Medicine to help you lose weight. Your health care provider may prescribe medicine if you are unable to lose 1 pound a week after 6 weeks of eating more healthily and doing more physical activity.  · Surgery. Surgical options may include gastric banding and gastric bypass. Surgery may be done if:  ? Other treatments have not helped to improve your condition.  ? You have a BMI of 40 or higher.  ? You have life-threatening health problems related to obesity.    Follow these instructions at home:    Eating and drinking    · Follow recommendations from your health care provider about what you eat and drink. Your health care provider may advise you to:  ? Limit fast foods, sweets, and processed snack foods.  ? Choose low-fat options, such as low-fat milk instead of whole milk.  ? Eat 5 or more servings of fruits or vegetables every day.  ? Eat at home more often. This gives you more control over  what you eat.  ? Choose healthy foods when you eat out.  ? Learn what a healthy portion size is.  ? Keep low-fat snacks on hand.  ? Avoid sugary drinks, such as soda, fruit juice, iced tea sweetened with sugar, and flavored milk.  ? Eat a healthy breakfast.  · Drink enough water to keep your urine clear or pale yellow.  · Do not go without eating for long periods of time (do not fast) or follow a fad diet. Fasting and fad diets can be unhealthy and even dangerous.  Physical Activity  · Exercise regularly, as told by your health care provider. Ask your health care provider what types of exercise are safe for you and how often you should exercise.  · Warm up and stretch before being active.  · Cool down and stretch after being active.  · Rest between periods of activity.  Lifestyle  · Limit the time that you spend in front of your TV, computer, or video game system.  · Find ways to reward yourself that do not involve food.  · Limit alcohol intake to no more than 1 drink a day for nonpregnant women and 2 drinks a day for men. One drink equals 12 oz of beer, 5 oz of wine, or 1½ oz of hard liquor.  General instructions  · Keep a weight loss journal to keep track of the food you eat and how much you exercise you get.  · Take over-the-counter and prescription medicines only as told by your health care provider.  · Take vitamins and supplements only as told by your health care provider.  · Consider joining a support group. Your health care provider may be able to recommend a support group.  · Keep all follow-up visits as told by your health care provider. This is important.  Contact a health care provider if:  · You are unable to meet your weight loss goal after 6 weeks of dietary and lifestyle changes.  This information is not intended to replace advice given to you by your health care provider. Make sure you discuss any questions you have with your health care provider.  Document Released: 01/25/2006 Document Revised:  05/22/2017 Document Reviewed: 10/05/2016  [employer] Interactive Patient Education © 2018 [employer].  MyPlate from [employer]  The general, healthful diet is based on the 2010 Dietary Guidelines for Americans. The amount of food you need to eat from each food group depends on your age, sex, and level of physical activity and can be individualized by a dietitian. Go to ChooseMyPlate.gov for more information.  What do I need to know about the MyPlate plan?  · Enjoy your food, but eat less.  · Avoid oversized portions.  ? ½ of your plate should include fruits and vegetables.  ? ¼ of your plate should be grains.  ? ¼ of your plate should be protein.  Grains  · Make at least half of your grains whole grains.  · For a 2,000 calorie daily food plan, eat 6 oz every day.  · 1 oz is about 1 slice bread, 1 cup cereal, or ½ cup cooked rice, cereal, or pasta.  Vegetables  · Make half your plate fruits and vegetables.  · For a 2,000 calorie daily food plan, eat 2½ cups every day.  · 1 cup is about 1 cup raw or cooked vegetables or vegetable juice or 2 cups raw leafy greens.  Fruits  · Make half your plate fruits and vegetables.  · For a 2,000 calorie daily food plan, eat 2 cups every day.  · 1 cup is about 1 cup fruit or 100% fruit juice or ½ cup dried fruit.  Protein  · For a 2,000 calorie daily food plan, eat 5½ oz every day.  · 1 oz is about 1 oz meat, poultry, or fish, ¼ cup cooked beans, 1 egg, 1 Tbsp peanut butter, or ½ oz nuts or seeds.  Dairy  · Switch to fat-free or low-fat (1%) milk.  · For a 2,000 calorie daily food plan, eat 3 cups every day.  · 1 cup is about 1 cup milk or yogurt or soy milk (soy beverage), 1½ oz natural cheese, or 2 oz processed cheese.  Fats, Oils, and Empty Calories  · Only small amounts of oils are recommended.  · Empty calories are calories from solid fats or added sugars.  · Compare sodium in foods like soup, bread, and frozen meals. Choose the foods with lower numbers.  · Drink water instead of  sugary drinks.  What foods can I eat?  Grains  Whole grains such as whole wheat, quinoa, millet, and bulgur. Bread, rolls, and pasta made from whole grains. Brown or wild rice. Hot or cold cereals made from whole grains and without added sugar.  Vegetables  All fresh vegetables, especially fresh red, dark green, or orange vegetables. Peas and beans. Low-sodium frozen or canned vegetables prepared without added salt. Low-sodium vegetable juices.  Fruits  All fresh, frozen, and dried fruits. Canned fruit packed in water or fruit juice without added sugar. Fruit juices without added sugar.  Meats and Other Protein Sources  Boiled, baked, or grilled lean meat trimmed of fat. Skinless poultry. Fresh seafood and shellfish. Canned seafood packed in water. Unsalted nuts and unsalted nut butters. Tofu. Dried beans and pea. Eggs.  Dairy  Low-fat or fat-free milk, yogurt, and cheeses.  Sweets and Desserts  Frozen desserts made from low-fat milk.  Fats and Oils  Olive, peanut, and canola oils and margarine. Salad dressing and mayonnaise made from these oils.  Other  Soups and casseroles made from allowed ingredients and without added fat or salt.  The items listed above may not be a complete list of recommended foods or beverages. Contact your dietitian for more options.  What foods are not recommended?  Grains  Sweetened, low-fiber cereals. Packaged baked goods. Snack crackers and chips. Cheese crackers, butter crackers, and biscuits. Frozen waffles, sweet breads, doughnuts, pastries, packaged baking mixes, pancakes, cakes, and cookies.  Vegetables  Regular canned or frozen vegetables or vegetables prepared with salt. Canned tomatoes. Canned tomato sauce. Fried vegetables. Vegetables in cream sauce or cheese sauce.  Fruits  Fruits packed in syrup or made with added sugar.  Meats and Other Protein Sources  Marbled or fatty meats such as ribs. Poultry with skin. Fried meats, poultry, eggs, or fish. Sausages, hot dogs, and deli  meats such as pastrami, bologna, or salami.  Dairy  Whole milk, cream, cheeses made from whole milk, sour cream. Ice cream or yogurt made from whole milk or with added sugar.  Beverages  For adults, no more than one alcoholic drink per day. Regular soft drinks or other sugary beverages. Juice drinks.  Sweets and Desserts  Sugary or fatty desserts, candy, and other sweets.  Fats and Oils  Solid shortening or partially hydrogenated oils. Solid margarine. Margarine that contains trans fats. Butter.  The items listed above may not be a complete list of foods and beverages to avoid. Contact your dietitian for more information.  This information is not intended to replace advice given to you by your health care provider. Make sure you discuss any questions you have with your health care provider.  Document Released: 01/06/2009 Document Revised: 05/25/2017 Document Reviewed: 11/26/2014  [employer] Interactive Patient Education © 2018 Elsevier Inc.

## 2022-03-04 NOTE — PROGRESS NOTES
Cardiac clearance req was received in office from  Urology. This was reviewed by Dr.Robert Mcclellan and fax[facility] back[facility]   Pelvic fracture. Continue PT/OT. Plan for inpatient rehab at [facility] following discharge - awaiting approval.

## 2022-04-14 ENCOUNTER — OFFICE VISIT (OUTPATIENT)
Dept: CARDIOLOGY | Facility: CLINIC | Age: 67
End: 2022-04-14

## 2022-04-14 VITALS
SYSTOLIC BLOOD PRESSURE: 134 MMHG | BODY MASS INDEX: 26.77 KG/M2 | DIASTOLIC BLOOD PRESSURE: 87 MMHG | HEART RATE: 75 BPM | HEIGHT: 70 IN | OXYGEN SATURATION: 97 % | WEIGHT: 187 LBS

## 2022-04-14 DIAGNOSIS — R07.2 PRECORDIAL PAIN: Primary | ICD-10-CM

## 2022-04-14 DIAGNOSIS — I10 ESSENTIAL HYPERTENSION: ICD-10-CM

## 2022-04-14 DIAGNOSIS — R06.02 SHORTNESS OF BREATH: ICD-10-CM

## 2022-04-14 PROCEDURE — 93000 ELECTROCARDIOGRAM COMPLETE: CPT | Performed by: PHYSICIAN ASSISTANT

## 2022-04-14 PROCEDURE — 99204 OFFICE O/P NEW MOD 45 MIN: CPT | Performed by: PHYSICIAN ASSISTANT

## 2022-04-14 RX ORDER — PRAVASTATIN SODIUM 40 MG
40 TABLET ORAL DAILY
COMMUNITY

## 2022-04-14 RX ORDER — PANTOPRAZOLE SODIUM 40 MG/1
40 TABLET, DELAYED RELEASE ORAL DAILY
COMMUNITY

## 2022-04-14 RX ORDER — AMLODIPINE BESYLATE 2.5 MG/1
2.5 TABLET ORAL DAILY
COMMUNITY

## 2022-04-14 NOTE — PROGRESS NOTES
Subjective   Alfredo Lindsey is a 66 y.o. male     Chief Complaint   Patient presents with   • Establish Care     Records in chart from pcp    • Chest Pain       HPI  The patient presents into the clinic today to reestablish care.  He was last seen just over 3 years ago, at which time work-up for symptoms including stress and echo studies were benign.  He is referred back because of his degree of chest tightness, marked fatigue, and ongoing dyspnea.  His primary care was very much concerned that symptoms represent anginal equivalent issues.  The patient tells me that in particular over the last year, exercise capacity and exercise tolerance have markedly declined.  This is primarily related to his degree of fatigue.  Also with exertion, he develops significant dyspnea and then chest tightness.  Symptoms limit his ability to exert further.  He must rest for several minutes before symptoms eventually resolved.  Symptoms are now progressive into the point that he is very concerned with the same.  He is experienced no failure nor dysrhythmic symptoms of any significance.  He was noted to be hypertensive recently.  He was continued on Cardizem and by report started on amlodipine 2.5 mg and losartan 100 mg daily.  It was felt best to refer him on for cardiac evaluation and he presents today in that setting.  The patient has no further complaints otherwise at this time.      Current Outpatient Medications   Medication Sig Dispense Refill   • amLODIPine (NORVASC) 2.5 MG tablet Take 2.5 mg by mouth Daily.     • aspirin 81 MG tablet Take 1 tablet by mouth Daily. 30 tablet 11   • diltiaZEM CD (CARDIZEM CD) 240 MG 24 hr capsule Take 1 capsule by mouth Daily. 30 capsule 11   • escitalopram (LEXAPRO) 20 MG tablet Take 20 mg by mouth Daily.  2   • losartan (COZAAR) 100 MG tablet Take 100 mg by mouth Daily.     • nitroglycerin (NITROSTAT) 0.4 MG SL tablet 1 under the tongue as needed for angina, may repeat q5mins for up three doses  "25 tablet 6   • pantoprazole (PROTONIX) 40 MG EC tablet Take 40 mg by mouth Daily.     • pravastatin (PRAVACHOL) 40 MG tablet Take 40 mg by mouth Daily.       No current facility-administered medications for this visit.       Patient has no known allergies.    Past Medical History:   Diagnosis Date   • H. pylori infection    • Hypertension    • Kidney stones        Social History     Socioeconomic History   • Marital status:    Tobacco Use   • Smoking status: Never Smoker   • Smokeless tobacco: Never Used   Substance and Sexual Activity   • Alcohol use: No   • Drug use: No   • Sexual activity: Defer       Family History   Problem Relation Age of Onset   • Cancer Mother    • Heart disease Mother    • Heart failure Mother    • Hypertension Mother    • Hyperlipidemia Mother    • Heart disease Father        Review of Systems   Constitutional: Positive for chills and fatigue. Negative for fever.   HENT: Negative.  Negative for congestion, rhinorrhea and sore throat.    Eyes: Positive for visual disturbance (glasses).   Respiratory: Positive for chest tightness and shortness of breath.    Cardiovascular: Positive for chest pain. Negative for palpitations and leg swelling.   Gastrointestinal: Negative.    Endocrine: Positive for cold intolerance.   Genitourinary: Negative.    Musculoskeletal: Positive for arthralgias and back pain. Negative for neck pain.   Skin: Negative.  Negative for rash and wound.   Allergic/Immunologic: Positive for environmental allergies.   Neurological: Positive for dizziness and numbness (arms / legs ). Negative for weakness and headaches.   Hematological: Negative.  Does not bruise/bleed easily.   Psychiatric/Behavioral: Negative.  Negative for sleep disturbance.       Objective     Vitals:    04/14/22 1509   BP: 134/87   BP Location: Left arm   Patient Position: Sitting   Pulse: 75   SpO2: 97%   Weight: 84.8 kg (187 lb)   Height: 177.8 cm (70\")        /87 (BP Location: Left arm, " "Patient Position: Sitting)   Pulse 75   Ht 177.8 cm (70\")   Wt 84.8 kg (187 lb)   SpO2 97%   BMI 26.83 kg/m²      Lab Results (most recent)     None          Physical Exam  Vitals and nursing note reviewed.   Constitutional:       General: He is not in acute distress.     Appearance: He is well-developed.   HENT:      Head: Normocephalic and atraumatic.   Eyes:      Conjunctiva/sclera: Conjunctivae normal.      Pupils: Pupils are equal, round, and reactive to light.   Neck:      Vascular: No JVD.      Trachea: No tracheal deviation.   Cardiovascular:      Rate and Rhythm: Normal rate and regular rhythm.      Heart sounds: Normal heart sounds.   Pulmonary:      Effort: Pulmonary effort is normal.      Breath sounds: Normal breath sounds.   Abdominal:      General: Bowel sounds are normal. There is no distension.      Palpations: Abdomen is soft. There is no mass.      Tenderness: There is no abdominal tenderness. There is no guarding or rebound.   Musculoskeletal:         General: No tenderness or deformity. Normal range of motion.      Cervical back: Normal range of motion and neck supple.   Skin:     General: Skin is warm and dry.      Coloration: Skin is not pale.      Findings: No erythema or rash.   Neurological:      Mental Status: He is alert and oriented to person, place, and time.   Psychiatric:         Behavior: Behavior normal.         Thought Content: Thought content normal.         Judgment: Judgment normal.         Procedure     ECG 12 Lead    Date/Time: 4/14/2022 3:13 PM  Performed by: Miah Montana PA  Authorized by: Miah Montana PA   Comparison: compared with previous ECG from 6/13/2018  Comparison to previous ECG: Sinus rhythm, rate 69, normal axis, no acute changes noted.                   Assessment/Plan      Diagnosis Plan   1. Precordial pain  Adult Transthoracic Echo Complete W/ Cont if Necessary Per Protocol    Stress Test With Myocardial Perfusion One Day   2. Shortness of " breath  Adult Transthoracic Echo Complete W/ Cont if Necessary Per Protocol    Stress Test With Myocardial Perfusion One Day   3. Essential hypertension  Adult Transthoracic Echo Complete W/ Cont if Necessary Per Protocol    Stress Test With Myocardial Perfusion One Day   1.  The patient is evaluated because of chest tightness, dyspnea, and his level of fatigue, all leading to exercise intolerance.  Cardiac evaluation has been requested and he presents today in that setting.    2.  I would schedule for nuclear stress test for ischemia assessment in that setting.    3.  We will schedule for an echo to evaluate LV size and function, valvular morphologies, and cardiac structure otherwise.    4.  The patient is on diltiazem and has done well with that therapy for some time.  Just recently, he was started on amlodipine and losartan.  He was hypertensive with a evaluation with his primary care provider.  He has not started those therapies for concern of potential hypotension.  I have advised him to hold amlodipine as he is taking diltiazem.  He will start the losartan but at 50 mg today as he is slightly hypertensive by today's exam.  He will monitor blood pressures closely at home and call to us for any issues.    5.  Recent laboratories apparently were unremarkable with the exception of degree of dyslipidemia by patient report.  We have requested records at this time.    6.  Further pending all the above and response to medication change.  He will call for complications.              Advance Care Planning   ACP discussion was declined by the patient. Patient does not have an advance directive, declines further assistance.           Electronically signed by:

## 2022-10-19 ENCOUNTER — TELEPHONE (OUTPATIENT)
Dept: CARDIOLOGY | Facility: CLINIC | Age: 67
End: 2022-10-19

## 2023-09-12 ENCOUNTER — TELEPHONE (OUTPATIENT)
Dept: CARDIOLOGY | Facility: CLINIC | Age: 68
End: 2023-09-12
Payer: COMMERCIAL

## 2023-09-12 NOTE — TELEPHONE ENCOUNTER
Caller: GRISEL    Relationship to patient: [place]     Best call back number: [phone]    Chief complaint: CHEST PAIN - BRADYCARDIA    Type of visit: FOLLOW UP    Requested date: 2-3 WEEKS        Additional notes:ADMITTED FOR CHEST PAIN- BRADYCARDIA -   HAD CATH - HAS BLOCKAGES BUT NOT STENTABLE- REQUEST TO  SEE DR PERALTA ABOUT GETTING ON MEDICATION TO CONTROL . PLEASE CALL PATIENT TO SCHEDULE ACCORDINGLY

## 2023-09-13 ENCOUNTER — TELEPHONE (OUTPATIENT)
Dept: CARDIOLOGY | Facility: CLINIC | Age: 68
End: 2023-09-13

## 2023-09-13 NOTE — TELEPHONE ENCOUNTER
Caller: Nory Lindsey    Relationship to patient: Emergency Contact    Best call back number: [phone]     New or established patient?  [] New  [x] Established    Date of discharge: 09.12.23    Facility discharged from: [facility]    Diagnosis/Symptoms: SOB, CHEST TIGHTNESS, CHEST PAIN, LOW HR IN 30S    Length of stay (If applicable): 2 DAYS    Specialty Only: Did you see a [facility] health provider?  No  If so, who?       PATIENT HAD A HEART CATH AT THE HOSPITAL 09.12.23

## 2023-09-13 NOTE — TELEPHONE ENCOUNTER
Unable to reach patient or emergency contact. Mailbox full on both numbers. Message routed to front office for appointment.

## 2023-09-14 NOTE — TELEPHONE ENCOUNTER
Janiya CAM sent the pt a letter requesting him to call our office since we are unable to reach him via phone.

## 2023-09-15 ENCOUNTER — TELEPHONE (OUTPATIENT)
Dept: CARDIOLOGY | Facility: CLINIC | Age: 68
End: 2023-09-15

## 2023-09-15 NOTE — TELEPHONE ENCOUNTER
Caller: Alfredo Lidnsey    Relationship to patient: Self    Best call back number: [phone]    Chief complaint: PT HAS BEEN IN THE [facility] AND WAS TOLD HE HAD BLOCKAGES.     Type of visit: F/U    Requested date: ANY    If rescheduling, when is the original appointment:     Additional notes:  ANYONE CAN SEE THE PT

## 2023-09-18 NOTE — TELEPHONE ENCOUNTER
Caller: [facility]    Relationship to patient: Provider    Best call back number: [phone]     Chief complaint: HOS- CHEST PAIN     Type of visit: F/U    Requested date: IN 2-3 WEEKS      Additional notes:PT HAD HEART CATH/ CHEST PAIN/. NEEDS HOS F/U. NEXT AVAILABLE ON MY SIDE IS NOT UNTIL 2024. PLEASE ADVISE.     PLEASE CALL PT FOR APPT SCHD.

## 2023-09-19 ENCOUNTER — OFFICE VISIT (OUTPATIENT)
Dept: CARDIOLOGY | Facility: CLINIC | Age: 68
End: 2023-09-19
Payer: MEDICARE

## 2023-09-19 VITALS
SYSTOLIC BLOOD PRESSURE: 137 MMHG | BODY MASS INDEX: 27.14 KG/M2 | OXYGEN SATURATION: 96 % | HEIGHT: 70 IN | WEIGHT: 189.6 LBS | HEART RATE: 64 BPM | DIASTOLIC BLOOD PRESSURE: 83 MMHG

## 2023-09-19 DIAGNOSIS — R00.2 PALPITATION: ICD-10-CM

## 2023-09-19 DIAGNOSIS — I25.10 CORONARY ARTERY DISEASE INVOLVING NATIVE CORONARY ARTERY OF NATIVE HEART WITHOUT ANGINA PECTORIS: Primary | ICD-10-CM

## 2023-09-19 DIAGNOSIS — R06.02 SHORTNESS OF BREATH: ICD-10-CM

## 2023-09-19 DIAGNOSIS — R55 NEAR SYNCOPE: ICD-10-CM

## 2023-09-19 DIAGNOSIS — E78.5 HYPERLIPIDEMIA, UNSPECIFIED HYPERLIPIDEMIA TYPE: ICD-10-CM

## 2023-09-19 DIAGNOSIS — R53.83 OTHER FATIGUE: ICD-10-CM

## 2023-09-19 DIAGNOSIS — I10 ESSENTIAL HYPERTENSION: ICD-10-CM

## 2023-09-19 RX ORDER — LISINOPRIL 10 MG/1
10 TABLET ORAL DAILY
Qty: 90 TABLET | Refills: 3 | Status: SHIPPED | OUTPATIENT
Start: 2023-09-19

## 2023-09-19 RX ORDER — ASPIRIN 81 MG/1
81 TABLET ORAL DAILY
Qty: 90 TABLET | Refills: 3 | Status: SHIPPED | OUTPATIENT
Start: 2023-09-19

## 2023-09-19 RX ORDER — ROSUVASTATIN CALCIUM 40 MG/1
40 TABLET, COATED ORAL DAILY
Qty: 90 TABLET | Refills: 3 | Status: SHIPPED | OUTPATIENT
Start: 2023-09-19

## 2023-09-19 RX ORDER — LISINOPRIL 10 MG/1
1 TABLET ORAL DAILY
COMMUNITY
Start: 2023-08-07 | End: 2023-09-19 | Stop reason: SDUPTHER

## 2023-09-19 RX ORDER — RANOLAZINE 500 MG/1
500 TABLET, EXTENDED RELEASE ORAL 2 TIMES DAILY
Qty: 90 TABLET | Refills: 3 | Status: SHIPPED | OUTPATIENT
Start: 2023-09-19

## 2023-09-19 RX ORDER — ISOSORBIDE MONONITRATE 30 MG/1
30 TABLET, EXTENDED RELEASE ORAL DAILY
Qty: 90 TABLET | Refills: 3 | Status: SHIPPED | OUTPATIENT
Start: 2023-09-19

## 2023-09-19 RX ORDER — AMLODIPINE BESYLATE 5 MG/1
5 TABLET ORAL DAILY
Qty: 90 TABLET | Refills: 3 | Status: SHIPPED | OUTPATIENT
Start: 2023-09-19

## 2023-09-19 RX ORDER — RANOLAZINE 500 MG/1
500 TABLET, EXTENDED RELEASE ORAL 2 TIMES DAILY
COMMUNITY
End: 2023-09-19 | Stop reason: SDUPTHER

## 2023-09-19 NOTE — PROGRESS NOTES
Subjective     Alfredo Lindsey is a 67 y.o. male who presents today for Follow-up ([facility] and Cath f/u).    CHIEF COMPLIANT  Chief Complaint   Patient presents with    Follow-up     [facility] and Cath f/u       Active Problems:  1.  Coronary artery disease: Cardiac catheterization 9/12/2023 main: Normal, LAD mid 60% FFR 0.85, D1 mid vessel 70% FFR 0.82, LCx: Dominant, PDA branch mid vessel 70% FFR 0.89 RCA: Normal, nondominant LDG: Normal, EF 60%  2.  Echocardiogram 9/11/2023 bradycardia with heart rate 43 bpm, normal LV systolic and diastolic function EF 60%, no significant valvular abnormalities  3.  Hypertension  4.  Hyperlipidemia  5.  Sinus bradycardia    HPI  Patient is a 67-year-old male who returns for follow-up after hospitalization in September due to chest pain, significant weakness and shortness of breath.  Patient was taken for left cardiac cath found to have mid LAD 60% with FFR 0.85, D1 mid vessel 70% FFR 0.82, LCX mid vessel 70% with FFR 0.89.  EF 60%.  Medical management was recommended.  The patient states when he presented to the hospital his heart rate was in the 30s.  During his echocardiogram his heart rate was documented at 43.  His heart rate did improved to the 60s prior to discharge.  Since discharge he has continued to have significant fatigue and decreased functional capacity.  Patient is a  and states that he is unable to walk from his house to his garage without becoming significantly fatigued.  He has not been able to work since discharge.  He denies additional chest pain but has continued to have dyspnea with exertion.  We did review his labs from hospitalization.  His creatinine was 1.5, potassium 4.2.  Overall cholesterol 187, HDL 48, .  A1c 5.3.  Pressure today is 137/83.    PRIOR MEDS  Current Outpatient Medications on File Prior to Visit   Medication Sig Dispense Refill    nitroglycerin (NITROSTAT) 0.4 MG SL tablet 1 under the tongue as needed for angina, may  repeat q5mins for up three doses 25 tablet 6    pantoprazole (PROTONIX) 40 MG EC tablet Take 1 tablet by mouth Daily.      sertraline (ZOLOFT) 50 MG tablet Take 1 tablet by mouth Daily.       No current facility-administered medications on file prior to visit.       ALLERGIES  Patient has no known allergies.    HISTORY  Past Medical History:   Diagnosis Date    H. pylori infection     Hypertension     Kidney stones        Social History     Socioeconomic History    Marital status:    Tobacco Use    Smoking status: Never    Smokeless tobacco: Never   Substance and Sexual Activity    Alcohol use: No    Drug use: No    Sexual activity: Defer       Family History   Problem Relation Age of Onset    Cancer Mother     Heart disease Mother     Heart failure Mother     Hypertension Mother     Hyperlipidemia Mother     Heart disease Father        Review of Systems   Constitutional:  Positive for fatigue. Negative for chills, diaphoresis and fever.   HENT: Negative.     Eyes: Negative.  Negative for visual disturbance.   Respiratory:  Positive for cough, chest tightness and shortness of breath (with exertion or walking). Negative for apnea and wheezing.    Cardiovascular:  Positive for chest pain and palpitations. Negative for leg swelling.   Gastrointestinal: Negative.  Negative for blood in stool.   Endocrine: Negative.  Negative for cold intolerance and heat intolerance.   Genitourinary: Negative.  Negative for hematuria.   Musculoskeletal:  Positive for back pain and neck stiffness. Negative for arthralgias, myalgias and neck pain.   Skin: Negative.  Negative for color change, rash and wound.   Allergic/Immunologic: Negative.  Negative for environmental allergies and food allergies.   Neurological:  Positive for dizziness (when getting up), syncope (syncope x2 in the past 2 weeks) and light-headedness. Negative for weakness, numbness and headaches.   Hematological: Negative.  Does not bruise/bleed easily.  "  Psychiatric/Behavioral:  Positive for sleep disturbance (smother when lying down and wake up coughing).      Objective     VITALS: /83 (BP Location: Right arm, Patient Position: Sitting)   Pulse 64   Ht 177.8 cm (70\")   Wt 86 kg (189 lb 9.6 oz)   SpO2 96%   BMI 27.20 kg/m²     LABS:   Lab Results (most recent)       None            IMAGING:   No Images in the past 120 days found..    EXAM:  Physical Exam  Constitutional:       Appearance: Normal appearance.   Eyes:      Pupils: Pupils are equal, round, and reactive to light.   Cardiovascular:      Rate and Rhythm: Normal rate and regular rhythm.      Pulses:           Carotid pulses are 2+ on the right side and 2+ on the left side.       Radial pulses are 2+ on the right side and 2+ on the left side.        Dorsalis pedis pulses are 2+ on the right side and 2+ on the left side.        Posterior tibial pulses are 2+ on the right side and 2+ on the left side.      Heart sounds: Normal heart sounds.   Pulmonary:      Effort: Pulmonary effort is normal.      Breath sounds: Normal breath sounds.   Abdominal:      General: Bowel sounds are normal.      Palpations: Abdomen is soft.   Musculoskeletal:      Right lower leg: No edema.      Left lower leg: No edema.   Skin:     General: Skin is warm and dry.      Capillary Refill: Capillary refill takes less than 2 seconds.   Neurological:      General: No focal deficit present.      Mental Status: He is alert and oriented to person, place, and time.   Psychiatric:         Mood and Affect: Mood normal.         Thought Content: Thought content normal.       Procedure   Procedures       Assessment & Plan    Diagnosis Plan   1. Coronary artery disease involving native coronary artery of native heart without angina pectoris        2. Near syncope  [facility]      3. Other fatigue        4. Essential hypertension  aspirin 81 MG EC tablet      5. Hyperlipidemia, unspecified hyperlipidemia type   "      6. Shortness of breath  aspirin 81 MG EC tablet      7. Palpitation  [facility]    aspirin 81 MG EC tablet        Plan:  1.  Coronary artery disease: Continue aspirin, statin, lisinopril,  and Ranexa. Will add Imdur. No beta-blocker due to significant bradycardia during hospitalization.  He currently denies chest pain but does continue to have significant fatigue and decreased functional capacity.  If his symptoms do not improve we may consider repeat catheterization.  2.  Near syncope/fatigue: The patient was discharged on diltiazem 240 mg.  We will stop this as he was significantly bradycardic during hospitalization.  We will place him on an event monitor to evaluate for further bradycardia.  3.  Hypertension: Since we are stopping diltiazem we will add amlodipine 5 mg p.o. daily.  The patient was instructed to monitor BP at home and to notify our office if systolic BP is consistently greater than 130-135 systolic.  Goal BP is 130-135/70-80.  4.  Hyperlipidemia: We will stop pravastatin and change the patient to rosuvastatin.  Will recheck lipid panel in 3-6 months.    Return in about 6 weeks (around 10/31/2023).    Patient brought in medicine list to appointment, it's been reviewed with patient and med list was updated in the chart.      Advance Care Planning   ACP discussion was declined by the patient. Patient does not have an advance directive, declines further assistance.            MEDS ORDERED DURING VISIT:  New Medications Ordered This Visit   Medications    isosorbide mononitrate (IMDUR) 30 MG 24 hr tablet     Sig: Take 1 tablet by mouth Daily.     Dispense:  90 tablet     Refill:  3    amLODIPine (NORVASC) 5 MG tablet     Sig: Take 1 tablet by mouth Daily.     Dispense:  90 tablet     Refill:  3    rosuvastatin (CRESTOR) 40 MG tablet     Sig: Take 1 tablet by mouth Daily.     Dispense:  90 tablet     Refill:  3    lisinopril (PRINIVIL,ZESTRIL) 10 MG tablet     Sig: Take 1  tablet by mouth Daily.     Dispense:  90 tablet     Refill:  3    ranolazine (RANEXA) 500 MG 12 hr tablet     Sig: Take 1 tablet by mouth 2 (Two) Times a Day.     Dispense:  90 tablet     Refill:  3    aspirin 81 MG EC tablet     Sig: Take 1 tablet by mouth Daily.     Dispense:  90 tablet     Refill:  3       DISCONTINUED MEDS DURING VISIT:   Medications Discontinued During This Encounter   Medication Reason    losartan (COZAAR) 100 MG tablet Discontinued by another clinician    escitalopram (LEXAPRO) 20 MG tablet Discontinued by another clinician    pravastatin (PRAVACHOL) 40 MG tablet     diltiaZEM CD (CARDIZEM CD) 240 MG 24 hr capsule     amLODIPine (NORVASC) 2.5 MG tablet     aspirin 81 MG tablet Reorder    lisinopril (PRINIVIL,ZESTRIL) 10 MG tablet Reorder    ranolazine (RANEXA) 500 MG 12 hr tablet Reorder          This document has been electronically signed by OLIVIA Moreno  September 21, 2023 12:47 EDT    Dictated Utilizing Dragon Dictation: Part of this note may be an electronic transcription/translation of spoken language to printed text using the Dragon Dictation System

## 2023-09-20 ENCOUNTER — TELEPHONE (OUTPATIENT)
Dept: CARDIOLOGY | Facility: CLINIC | Age: 68
End: 2023-09-20
Payer: MEDICARE

## 2023-09-20 NOTE — TELEPHONE ENCOUNTER
Email sent to Aliya @ [facility] requesting cardiac cath film for DOS 9/12/23 to be powershared with [facility] so Dr. Mcclellan can review them per Ashley Abraham's request.     Patient Education            Current as of: November 29, 2017  Content Version: 11.7  © 1734-4007 [employer]. Care instructions adapted under license by [facility] ([facility]). If you have questions about a medical condition or this instruction, always ask your healthcare professional. [address] any warranty or liability for your use of this information. Patient Education        Seborrheic Keratosis: Care Instructions  Your Care Instructions  Seborrheic keratoses are raised skin growths that look scaly or warty. They usually look like they were stuck onto the skin. They most often grow in groups on the back or chest and are more common in older people. A seborrheic keratosis can be tan or dark brown. A seborrheic keratosis is not a mole and is almost always harmless. But it is still a good idea to check your skin regularly. Sometimes a seborrheic keratosis can itch. Scratching it can cause it to bleed and sometimes even scar. A seborrheic keratosis is removed only if it bothers you. The doctor will freeze it or scrape it off with a tool. The doctor can also use a laser to remove a seborrheic keratosis. Treatment usually results in normal-looking skin, but it can leave a light or dark raul or even a scar on the skin. Follow-up care is a key part of your treatment and safety. Be sure to make and go to all appointments, and call your doctor if you are having problems. It's also a good idea to know your test results and keep a list of the medicines you take. How can you care for yourself at home? · If clothing irritates your seborrheic keratosis, cover it with a bandage to prevent rubbing and bleeding. · If you have a seborrheic keratosis removed, clean the area with soap and water two times a day unless your doctor gives you different instructions. Don't use hydrogen peroxide or alcohol, which can slow healing.   ¨ You may cover the wound with a thin layer of petroleum jelly, instruction, always ask your healthcare professional. [place] [address] any warranty or liability for your use of this information.

## 2023-10-31 ENCOUNTER — OFFICE VISIT (OUTPATIENT)
Dept: CARDIOLOGY | Facility: CLINIC | Age: 68
End: 2023-10-31
Payer: MEDICARE

## 2023-10-31 ENCOUNTER — HOSPITAL ENCOUNTER (OUTPATIENT)
Dept: CARDIOLOGY | Facility: HOSPITAL | Age: 68
Discharge: HOME | End: 2023-10-31
Admitting: CLINICAL NURSE SPECIALIST
Payer: MEDICARE

## 2023-10-31 VITALS
BODY MASS INDEX: 27.17 KG/M2 | HEIGHT: 70 IN | WEIGHT: 189.8 LBS | HEART RATE: 84 BPM | SYSTOLIC BLOOD PRESSURE: 116 MMHG | OXYGEN SATURATION: 96 % | DIASTOLIC BLOOD PRESSURE: 66 MMHG

## 2023-10-31 DIAGNOSIS — M25.531 PAIN AND SWELLING OF RIGHT WRIST: ICD-10-CM

## 2023-10-31 DIAGNOSIS — G47.10 HYPERSOMNIA, UNSPECIFIED: ICD-10-CM

## 2023-10-31 DIAGNOSIS — I25.118 ATHEROSCLEROTIC HEART DISEASE OF NATIVE CORONARY ARTERY WITH OTHER FORMS OF ANGINA PECTORIS: ICD-10-CM

## 2023-10-31 DIAGNOSIS — R00.1 BRADYCARDIA, SINUS: ICD-10-CM

## 2023-10-31 DIAGNOSIS — R53.83 OTHER FATIGUE: ICD-10-CM

## 2023-10-31 DIAGNOSIS — R40.0 DAYTIME SOMNOLENCE: ICD-10-CM

## 2023-10-31 DIAGNOSIS — M25.431 PAIN AND SWELLING OF RIGHT WRIST: ICD-10-CM

## 2023-10-31 DIAGNOSIS — I25.10 CORONARY ARTERY DISEASE INVOLVING NATIVE CORONARY ARTERY OF NATIVE HEART WITHOUT ANGINA PECTORIS: Primary | ICD-10-CM

## 2023-10-31 DIAGNOSIS — I10 ESSENTIAL HYPERTENSION: ICD-10-CM

## 2023-10-31 PROCEDURE — 3074F SYST BP LT 130 MM HG: CPT | Performed by: CLINICAL NURSE SPECIALIST

## 2023-10-31 PROCEDURE — 93931 UPPER EXTREMITY STUDY: CPT | Performed by: RADIOLOGY

## 2023-10-31 PROCEDURE — 93931 UPPER EXTREMITY STUDY: CPT

## 2023-10-31 PROCEDURE — 1160F RVW MEDS BY RX/DR IN RCRD: CPT | Performed by: CLINICAL NURSE SPECIALIST

## 2023-10-31 PROCEDURE — 3078F DIAST BP <80 MM HG: CPT | Performed by: CLINICAL NURSE SPECIALIST

## 2023-10-31 PROCEDURE — 99214 OFFICE O/P EST MOD 30 MIN: CPT | Performed by: CLINICAL NURSE SPECIALIST

## 2023-10-31 PROCEDURE — 1159F MED LIST DOCD IN RCRD: CPT | Performed by: CLINICAL NURSE SPECIALIST

## 2023-10-31 NOTE — PROGRESS NOTES
"Subjective     Alfredo Lindsey is a 67 y.o. male who presents today for Follow-up (Monitor results).    CHIEF COMPLIANT  Chief Complaint   Patient presents with    Follow-up     Monitor results       Active Problems:  1.  Coronary artery disease: Cardiac catheterization 9/12/2023 main: Normal, LAD mid 60% FFR 0.85, D1 mid vessel 70% FFR 0.82, LCx: Dominant, PDA branch mid vessel 70% FFR 0.89 RCA: Normal, nondominant LDG: Normal, EF 60%  2.  Echocardiogram 9/11/2023 bradycardia with heart rate 43 bpm, normal LV systolic and diastolic function EF 60%, no significant valvular abnormalities  3.  Hypertension  4.  Hyperlipidemia  5.  Sinus bradycardia  6.  Event monitor: Predominant rhythm was sinus bradycardia.  The patient did have 2 patient activated events both were sinus bradycardia.  With rate 56 to 59 bpm.  There were a couple episodes of sinus tachycardia 1 with a max heart rate up to 161 the other with a heart rate of 142.     HPI  The patient is a 67-year-old male returns for follow-up to discuss event monitor findings and medication changes the patient's event monitor did show the primary rhythm to be sinus bradycardia.  He had to activated events which were both sinus bradycardia with rate 56 to 59 bpm.  There was 1 episode of sinus bradycardia with rate in the 40s which occurred in the afternoon, the patient did not activate symptoms at this time.  Also had a bradycardia event during hours of sleep with heart rate down in the 40s.  Overall there were no significant findings or evidence of arrhythmia.  Patient does feel like his heart rate has more stable since his last visit.  He denies syncope, near syncope.  He does continue to have fatigue but feels like this is slowly improving.  At his last visit we added isosorbide which he states has improved chest pain symptoms.  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  On the " days he is feeling more fatigued he has noted that his blood pressure is running more on the low side.  His only other complaint is pain just above the right radial cath site.  The patient states that 3 to 4 days ago he started having discomfort at the cath site and over the past 3 days he has noted a knot that has developed and has seemed to get bigger each day.    PRIOR MEDS  Current Outpatient Medications on File Prior to Visit   Medication Sig Dispense Refill    amLODIPine (NORVASC) 5 MG tablet Take 1 tablet by mouth Daily. 90 tablet 3    aspirin 81 MG EC tablet Take 1 tablet by mouth Daily. 90 tablet 3    isosorbide mononitrate (IMDUR) 30 MG 24 hr tablet Take 1 tablet by mouth Daily. 90 tablet 3    lisinopril (PRINIVIL,ZESTRIL) 10 MG tablet Take 1 tablet by mouth Daily. 90 tablet 3    nitroglycerin (NITROSTAT) 0.4 MG SL tablet 1 under the tongue as needed for angina, may repeat q5mins for up three doses 25 tablet 6    pantoprazole (PROTONIX) 40 MG EC tablet Take 1 tablet by mouth Daily.      ranolazine (RANEXA) 500 MG 12 hr tablet Take 1 tablet by mouth 2 (Two) Times a Day. (Patient taking differently: Take 1 tablet by mouth Daily.) 90 tablet 3    rosuvastatin (CRESTOR) 40 MG tablet Take 1 tablet by mouth Daily. 90 tablet 3    sertraline (ZOLOFT) 50 MG tablet Take 1 tablet by mouth Daily.       No current facility-administered medications on file prior to visit.       ALLERGIES  Patient has no known allergies.    HISTORY  Past Medical History:   Diagnosis Date    H. pylori infection     Hypertension     Kidney stones        Social History     Socioeconomic History    Marital status:    Tobacco Use    Smoking status: Never    Smokeless tobacco: Never   Substance and Sexual Activity    Alcohol use: No    Drug use: No    Sexual activity: Defer       Family History   Problem Relation Age of Onset    Cancer Mother     Heart disease Mother     Heart failure Mother     Hypertension Mother     Hyperlipidemia  "Mother     Heart disease Father        Review of Systems   Constitutional: Negative.  Negative for chills, diaphoresis, fatigue and fever.   Eyes:  Positive for visual disturbance (needs glasses).   Respiratory:  Positive for cough and chest tightness. Negative for apnea, shortness of breath and wheezing.    Cardiovascular:  Positive for chest pain (occas. when laying down) and palpitations (occas. when laying down). Negative for leg swelling.   Gastrointestinal: Negative.  Negative for blood in stool.   Endocrine: Negative.  Negative for cold intolerance and heat intolerance.   Genitourinary: Negative.  Negative for hematuria.   Musculoskeletal:  Positive for arthralgias and myalgias (legs). Negative for back pain, neck pain and neck stiffness.        Feet stays cold   Skin: Negative.  Negative for color change, rash and wound.   Allergic/Immunologic: Negative.  Negative for environmental allergies and food allergies.   Neurological:  Positive for dizziness (with the new medication Ranolazine), syncope (near syncope with the Ranolazine), light-headedness, numbness (RT arm) and headaches. Negative for weakness.   Hematological: Negative.  Does not bruise/bleed easily.   Psychiatric/Behavioral:  Positive for sleep disturbance (chest pain and palps when laying down; coughing).        Objective     VITALS: /66 (BP Location: Left arm, Patient Position: Sitting)   Pulse 84   Ht 177.8 cm (70\")   Wt 86.1 kg (189 lb 12.8 oz)   SpO2 96%   BMI 27.23 kg/m²     LABS:   Lab Results (most recent)       None            IMAGING:   No Images in the past 120 days found..    EXAM:  Physical Exam  Constitutional:       Appearance: Normal appearance.   Eyes:      Pupils: Pupils are equal, round, and reactive to light.   Cardiovascular:      Rate and Rhythm: Normal rate and regular rhythm.      Pulses:           Carotid pulses are 2+ on the right side and 2+ on the left side.       Radial pulses are 2+ on the right side and 2+ " on the left side.        Dorsalis pedis pulses are 2+ on the right side and 2+ on the left side.        Posterior tibial pulses are 2+ on the right side and 2+ on the left side.      Heart sounds: Normal heart sounds.   Pulmonary:      Effort: Pulmonary effort is normal.      Breath sounds: Normal breath sounds.   Abdominal:      General: Bowel sounds are normal.      Palpations: Abdomen is soft.   Musculoskeletal:      Right lower leg: No edema.      Left lower leg: No edema.   Skin:     General: Skin is warm and dry.      Capillary Refill: Capillary refill takes less than 2 seconds.      Comments: Right wrist with hardened area above healed cath site which is tender to touch.   Neurological:      General: No focal deficit present.      Mental Status: He is alert and oriented to person, place, and time.   Psychiatric:         Mood and Affect: Mood normal.         Thought Content: Thought content normal.         Procedure   Procedures       Assessment & Plan    Diagnosis Plan   1. Coronary artery disease involving native coronary artery of native heart without angina pectoris        2. Pain and swelling of right wrist  US Arterial Doppler Upper Extremity Right      3. Other fatigue  Home Sleep Study      4. Daytime somnolence  Home Sleep Study      5. Hypersomnia, unspecified  Home Sleep Study      6. Atherosclerotic heart disease of native coronary artery with other forms of angina pectoris  US Arterial Doppler Upper Extremity Right      7. Essential hypertension        8. Bradycardia, sinus          Plan:  1.  CAD: We will continue Ranexa and isosorbide.  We will continue lisinopril Crestor and aspirin.  We will decrease amlodipine to 2.5 mg p.o. daily.  No beta-blocker due to bradycardia.  No further chest pain symptoms.  He was advised to notify our office if the symptoms return or worsen.  2.  Pain and swelling of the right wrist: We will schedule the patient for an arterial Doppler of the right upper extremity  to right radial cath area.  3.  Fatigue, daytime somnolence: We will schedule the patient for home sleep study evaluate for possible sleep apnea.  There were episodes of bradycardia during hours of sleep.  He also continues to complain of fatigue and daytime sleepiness.  4.  Essential hypertension: We will decrease zotepine to 2.5 mg p.o. daily to allow for a little better blood pressure.  This could be contributing to his fatigue.  We will follow-up in 3 months.  5.  Sinus bradycardia: Continue to hold beta-blocker.  The patient was advised to notify our office if symptoms of syncope or near syncope develop.    Return in about 3 months (around 1/31/2024).    Patient did not bring med list or medicine bottles to appointment, med list has been reviewed and updated based on patient's knowledge of their meds.      Advance Care Planning   ACP discussion was declined by the patient. Patient does not have an advance directive, declines further assistance.          MEDS ORDERED DURING VISIT:  No orders of the defined types were placed in this encounter.      DISCONTINUED MEDS DURING VISIT:   There are no discontinued medications.       This document has been electronically signed by OLIVIA Moreno  October 31, 2023 12:49 EDT    Dictated Utilizing Dragon Dictation: Part of this note may be an electronic transcription/translation of spoken language to printed text using the Dragon Dictation System

## 2024-01-31 ENCOUNTER — OFFICE VISIT (OUTPATIENT)
Dept: CARDIOLOGY | Facility: CLINIC | Age: 69
End: 2024-01-31
Payer: MEDICARE

## 2024-01-31 VITALS
DIASTOLIC BLOOD PRESSURE: 79 MMHG | OXYGEN SATURATION: 97 % | HEART RATE: 78 BPM | HEIGHT: 70 IN | BODY MASS INDEX: 27.37 KG/M2 | WEIGHT: 191.2 LBS | SYSTOLIC BLOOD PRESSURE: 131 MMHG

## 2024-01-31 DIAGNOSIS — R53.83 OTHER FATIGUE: ICD-10-CM

## 2024-01-31 DIAGNOSIS — I10 ESSENTIAL HYPERTENSION: ICD-10-CM

## 2024-01-31 DIAGNOSIS — E78.5 HYPERLIPIDEMIA, UNSPECIFIED HYPERLIPIDEMIA TYPE: ICD-10-CM

## 2024-01-31 DIAGNOSIS — R00.1 BRADYCARDIA, SINUS: ICD-10-CM

## 2024-01-31 DIAGNOSIS — I25.118 ATHEROSCLEROTIC HEART DISEASE OF NATIVE CORONARY ARTERY WITH OTHER FORMS OF ANGINA PECTORIS: Primary | ICD-10-CM

## 2024-01-31 NOTE — PROGRESS NOTES
Subjective     Alfredo Lindsey is a 68 y.o. male who presents today for Follow-up (Arterial U/S, Carotid U/S results/Home sleep study to be mail in today).    CHIEF COMPLIANT  Chief Complaint   Patient presents with    Follow-up     Arterial U/S, Carotid U/S results  Home sleep study to be mail in today       Active Problems:  1.  Coronary artery disease: Cardiac catheterization 9/12/2023 main: Normal, LAD mid 60% FFR 0.85, D1 mid vessel 70% FFR 0.82, LCx: Dominant, PDA branch mid vessel 70% FFR 0.89 RCA: Normal, nondominant LDG: Normal, EF 60%  2.  Echocardiogram 9/11/2023 bradycardia with heart rate 43 bpm, normal LV systolic and diastolic function EF 60%, no significant valvular abnormalities  3.  Hypertension  4.  Hyperlipidemia  5.  Sinus bradycardia  6.  Event monitor: Predominant rhythm was sinus bradycardia.  The patient did have 2 patient activated events both were sinus bradycardia.  With rate 56 to 59 bpm.  There were a couple episodes of sinus tachycardia 1 with a max heart rate up to 161 the other with a heart rate of 142.        HPI  The patient is a 68-year-old male that returns for follow-up.  He does remain with fatigue.  We did order a home sleep study at his last visit however, he was just able to complete this last night.  It was apparently mailed to his neighbor's house and there was a delay in him receiving the test.  He denies significant chest pain, worsening shortness of air, syncope or near syncope.  He will occasionally have some dizziness if he stands too quickly.  His heart rate and blood pressure under good control.  His major complaint is the ongoing fatigue and decreased energy.  He feels very tired during the day and falls asleep easily.  He is very tired when waking up in the morning.        PRIOR MEDS  Current Outpatient Medications on File Prior to Visit   Medication Sig Dispense Refill    amLODIPine (NORVASC) 5 MG tablet Take 1 tablet by mouth Daily. 90 tablet 3    aspirin 81  MG EC tablet Take 1 tablet by mouth Daily. 90 tablet 3    isosorbide mononitrate (IMDUR) 30 MG 24 hr tablet Take 1 tablet by mouth Daily. 90 tablet 3    lisinopril (PRINIVIL,ZESTRIL) 10 MG tablet Take 1 tablet by mouth Daily. 90 tablet 3    nitroglycerin (NITROSTAT) 0.4 MG SL tablet 1 under the tongue as needed for angina, may repeat q5mins for up three doses 25 tablet 6    pantoprazole (PROTONIX) 40 MG EC tablet Take 1 tablet by mouth Daily.      ranolazine (RANEXA) 500 MG 12 hr tablet Take 1 tablet by mouth 2 (Two) Times a Day. (Patient taking differently: Take 1 tablet by mouth Daily.) 90 tablet 3    rosuvastatin (CRESTOR) 40 MG tablet Take 1 tablet by mouth Daily. 90 tablet 3    sertraline (ZOLOFT) 50 MG tablet Take 1 tablet by mouth Daily.       No current facility-administered medications on file prior to visit.       ALLERGIES  Patient has no known allergies.    HISTORY  Past Medical History:   Diagnosis Date    H. pylori infection     Hypertension     Kidney stones        Social History     Socioeconomic History    Marital status:    Tobacco Use    Smoking status: Never    Smokeless tobacco: Never   Substance and Sexual Activity    Alcohol use: No    Drug use: No    Sexual activity: Defer       Family History   Problem Relation Age of Onset    Cancer Mother     Heart disease Mother     Heart failure Mother     Hypertension Mother     Hyperlipidemia Mother     Heart disease Father        Review of Systems   Constitutional:  Positive for fatigue. Negative for chills, diaphoresis and fever.   HENT:  Positive for sore throat.         Possible ear infection   Eyes: Negative.  Negative for visual disturbance (sees eye dr today).   Respiratory:  Positive for cough (occas.) and chest tightness (occas.). Negative for apnea (finished home sleep study yesterday), shortness of breath and wheezing.    Cardiovascular:  Positive for chest pain (in center of back occas.) and palpitations (occas. heart pounding; hear  "heart beat in ear). Negative for leg swelling.   Gastrointestinal: Negative.  Negative for blood in stool.   Endocrine: Negative.  Negative for cold intolerance and heat intolerance.   Genitourinary: Negative.  Negative for hematuria.   Musculoskeletal:  Positive for arthralgias, back pain (middle of back) and myalgias (occas. in legs). Negative for neck pain and neck stiffness.   Skin: Negative.  Negative for color change, rash and wound.   Allergic/Immunologic: Negative.  Negative for environmental allergies and food allergies.   Neurological:  Positive for dizziness (getting up from a seated position and worse if getting up quickly occas.), light-headedness, numbness (toes and will get cold) and headaches (occas.). Negative for syncope and weakness.   Hematological: Negative.  Does not bruise/bleed easily.   Psychiatric/Behavioral:  Positive for sleep disturbance (insomnia).        Objective     VITALS: /79 (BP Location: Right arm, Patient Position: Sitting)   Pulse 78   Ht 177.8 cm (70\")   Wt 86.7 kg (191 lb 3.2 oz)   SpO2 97%   BMI 27.43 kg/m²     LABS:   Lab Results (most recent)       None            IMAGING:   US Arterial Doppler Upper Extremity Right    Result Date: 10/31/2023  1. No pseudoaneurysm 2. No occlusive disease   This report was finalized on 10/31/2023 12:30 PM by Dr. Fabio Root MD.        EXAM:    Constitutional:       Appearance: Normal appearance.   Eyes:      Pupils: Pupils are equal, round, and reactive to light.   Cardiovascular:      Rate and Rhythm: Normal rate and regular rhythm.      Pulses:           Carotid pulses are 2+ on the right side and 2+ on the left side.       Radial pulses are 2+ on the right side and 2+ on the left side.        Dorsalis pedis pulses are 2+ on the right side and 2+ on the left side.        Posterior tibial pulses are 2+ on the right side and 2+ on the left side.      Heart sounds: Normal heart sounds.   Pulmonary:      Effort: Pulmonary effort " is normal.      Breath sounds: Normal breath sounds.   Abdominal:      General: Bowel sounds are normal.      Palpations: Abdomen is soft.   Musculoskeletal:      Right lower leg: No edema.      Left lower leg: No edema.   Skin:     General: Skin is warm and dry.      Capillary Refill: Capillary refill takes less than 2 seconds.      Comments:  Neurological:      General: No focal deficit present.      Mental Status: He is alert and oriented to person, place, and time.   Psychiatric:         Mood and Affect: Mood normal.         Thought Content: Thought content normal.     Procedure   Procedures       Assessment & Plan    Diagnosis Plan   1. Atherosclerotic heart disease of native coronary artery with other forms of angina pectoris        2. Essential hypertension        3. Other fatigue        4. Hyperlipidemia, unspecified hyperlipidemia type        5. Bradycardia, sinus          1.  CAD: We will continue current medical management including aspirin, lisinopril, Imdur, rosuvastatin, amlodipine and Ranexa.  His chest pain symptoms have come under good control.  He does have sublingual nitro to use if needed and we did review instructions on appropriate use and when to seek emergency treatment.  He was advised to notify our office if chest pain symptoms develop.  2.  Essential hypertension: Blood pressure under good control with current medication regimen.  The patient was instructed to monitor BP at home and to notify our office if systolic BP is consistently greater than 130-135 systolic.  Goal BP is 130-135/70-80.  3.  Fatigue: He did send in his home sleep study this morning.  Will follow-up on results.  If this shows evidence of sleep apnea we will make referral to pulmonology.  4.  Hyperlipidemia: Will continue rosuvastatin.  Will periodically monitor lipid panel.  5.  Sinus bradycardia: Has resolved since stopping beta-blocker.  Will continue to monitor.    Return in about 6 months (around  7/31/2024).    Patient brought in medicine list to appointment, it's been reviewed with patient and med list was updated in the chart.     Advance Care Planning   ACP discussion was declined by the patient. Patient does not have an advance directive, declines further assistance.          MEDS ORDERED DURING VISIT:  No orders of the defined types were placed in this encounter.      DISCONTINUED MEDS DURING VISIT:   There are no discontinued medications.       This document has been electronically signed by OLIVIA Moreno  January 31, 2024 10:52 EST    Dictated Utilizing Dragon Dictation: Part of this note may be an electronic transcription/translation of spoken language to printed text using the Dragon Dictation System

## 2024-02-07 ENCOUNTER — TELEPHONE (OUTPATIENT)
Dept: CARDIOLOGY | Facility: CLINIC | Age: 69
End: 2024-02-07
Payer: MEDICARE

## 2024-02-07 DIAGNOSIS — G47.30 MILD SLEEP APNEA: Primary | ICD-10-CM

## 2024-02-07 NOTE — TELEPHONE ENCOUNTER
Unable to leave  for pt to return call regarding home sleep study results.        ----- Message from OLIVIA Archer sent at 2/6/2024  8:35 PM EST -----  Mild sleep apnea.  CPAP recommended.  Needs pulmonary referral.

## 2024-02-07 NOTE — TELEPHONE ENCOUNTER
Spoke with Nory pt's wife (on pt's HIPAA) of OLIVIA Ibrahim's recommendations. Nory verbalizes understanding and states it will be okay to make referral to [facility].    Referral placed

## 2024-09-12 ENCOUNTER — OFFICE VISIT (OUTPATIENT)
Dept: CARDIOLOGY | Facility: CLINIC | Age: 69
End: 2024-09-12
Payer: MEDICARE

## 2024-09-12 VITALS
HEART RATE: 56 BPM | HEIGHT: 70 IN | SYSTOLIC BLOOD PRESSURE: 172 MMHG | BODY MASS INDEX: 28.03 KG/M2 | DIASTOLIC BLOOD PRESSURE: 87 MMHG | WEIGHT: 195.8 LBS | OXYGEN SATURATION: 97 %

## 2024-09-12 DIAGNOSIS — I25.10 CORONARY ARTERY DISEASE INVOLVING NATIVE CORONARY ARTERY OF NATIVE HEART WITHOUT ANGINA PECTORIS: ICD-10-CM

## 2024-09-12 DIAGNOSIS — G47.33 OSA (OBSTRUCTIVE SLEEP APNEA): ICD-10-CM

## 2024-09-12 DIAGNOSIS — R07.2 PRECORDIAL PAIN: Primary | ICD-10-CM

## 2024-09-12 DIAGNOSIS — R53.83 OTHER FATIGUE: ICD-10-CM

## 2024-09-12 DIAGNOSIS — I10 ESSENTIAL HYPERTENSION: ICD-10-CM

## 2024-09-12 DIAGNOSIS — R06.02 SHORTNESS OF BREATH: ICD-10-CM

## 2024-09-12 PROCEDURE — 3077F SYST BP >= 140 MM HG: CPT | Performed by: CLINICAL NURSE SPECIALIST

## 2024-09-12 PROCEDURE — 3079F DIAST BP 80-89 MM HG: CPT | Performed by: CLINICAL NURSE SPECIALIST

## 2024-09-12 PROCEDURE — 99214 OFFICE O/P EST MOD 30 MIN: CPT | Performed by: CLINICAL NURSE SPECIALIST

## 2024-09-12 PROCEDURE — 93000 ELECTROCARDIOGRAM COMPLETE: CPT | Performed by: CLINICAL NURSE SPECIALIST

## 2024-09-12 RX ORDER — NITROGLYCERIN 0.4 MG/1
TABLET SUBLINGUAL
Qty: 25 TABLET | Refills: 6 | Status: SHIPPED | OUTPATIENT
Start: 2024-09-12

## 2024-09-12 RX ORDER — AMLODIPINE BESYLATE 5 MG/1
5 TABLET ORAL DAILY
Qty: 90 TABLET | Refills: 3 | Status: SHIPPED | OUTPATIENT
Start: 2024-09-12

## 2024-09-12 RX ORDER — LISINOPRIL 10 MG/1
10 TABLET ORAL DAILY
Qty: 90 TABLET | Refills: 3 | Status: SHIPPED | OUTPATIENT
Start: 2024-09-12

## 2024-09-12 RX ORDER — ASPIRIN 81 MG/1
81 TABLET ORAL DAILY
Qty: 90 TABLET | Refills: 3 | Status: SHIPPED | OUTPATIENT
Start: 2024-09-12

## 2024-09-12 RX ORDER — ISOSORBIDE MONONITRATE 60 MG/1
60 TABLET, EXTENDED RELEASE ORAL DAILY
Qty: 90 TABLET | Refills: 3 | Status: SHIPPED | OUTPATIENT
Start: 2024-09-12

## 2024-09-12 RX ORDER — ROSUVASTATIN CALCIUM 40 MG/1
40 TABLET, COATED ORAL DAILY
Qty: 90 TABLET | Refills: 3 | Status: SHIPPED | OUTPATIENT
Start: 2024-09-12

## 2024-09-12 RX ORDER — RANOLAZINE 500 MG/1
500 TABLET, EXTENDED RELEASE ORAL 2 TIMES DAILY
Qty: 90 TABLET | Refills: 3 | Status: SHIPPED | OUTPATIENT
Start: 2024-09-12

## 2024-09-12 NOTE — PROGRESS NOTES
Subjective     Alfredo Lindsey is a 68 y.o. male who presents today for Follow-up (6mth).    CHIEF COMPLIANT  Chief Complaint   Patient presents with    Follow-up     6mth       Active Problems:  1.  Coronary artery disease: Cardiac catheterization 9/12/2023 main: Normal, LAD mid 60% FFR 0.85, D1 mid vessel 70% FFR 0.82, LCx: Dominant, PDA branch mid vessel 70% FFR 0.89 RCA: Normal, nondominant LDG: Normal, EF 60%  2.  Echocardiogram 9/11/2023 bradycardia with heart rate 43 bpm, normal LV systolic and diastolic function EF 60%, no significant valvular abnormalities  3.  Hypertension  4.  Hyperlipidemia  5.  Sinus bradycardia  6.  Event monitor: Predominant rhythm was sinus bradycardia.  The patient did have 2 patient activated events both were sinus bradycardia.  With rate 56 to 59 bpm.  There were a couple episodes of sinus tachycardia 1 with a max heart rate up to 161 the other with a heart rate of 142.     HPI  The patient is a 68-year-old that returns for routine follow-up.  Over the past several weeks he has had intermittent chest pain which will radiate down to his left wrist.  He is also having some increased dyspnea.  This will sometimes wake him up at night but he is also having this with exertion.  His sleep study did show evidence of mild obstructive sleep apnea.  He denies syncope or near syncope.  He has noted some increased fatigue.  Blood pressure is elevated in the office today.  The patient states it has not been running this elevated at home and he has not had his medications today.  He had recent labs done at Dr. Jackson's office.  We will obtain these for review.    PRIOR MEDS  Current Outpatient Medications on File Prior to Visit   Medication Sig Dispense Refill    pantoprazole (PROTONIX) 40 MG EC tablet Take 1 tablet by mouth Daily.      sertraline (ZOLOFT) 50 MG tablet Take 1 tablet by mouth Daily.      [DISCONTINUED] amLODIPine (NORVASC) 5 MG tablet Take 1 tablet by mouth Daily. 90 tablet 3     "[DISCONTINUED] aspirin 81 MG EC tablet Take 1 tablet by mouth Daily. 90 tablet 3    [DISCONTINUED] isosorbide mononitrate (IMDUR) 30 MG 24 hr tablet Take 1 tablet by mouth Daily. 90 tablet 3    [DISCONTINUED] lisinopril (PRINIVIL,ZESTRIL) 10 MG tablet Take 1 tablet by mouth Daily. 90 tablet 3    [DISCONTINUED] nitroglycerin (NITROSTAT) 0.4 MG SL tablet 1 under the tongue as needed for angina, may repeat q5mins for up three doses 25 tablet 6    [DISCONTINUED] ranolazine (RANEXA) 500 MG 12 hr tablet Take 1 tablet by mouth 2 (Two) Times a Day. (Patient taking differently: Take 1 tablet by mouth Daily.) 90 tablet 3    [DISCONTINUED] rosuvastatin (CRESTOR) 40 MG tablet Take 1 tablet by mouth Daily. 90 tablet 3     No current facility-administered medications on file prior to visit.       ALLERGIES  Patient has no known allergies.    HISTORY  Past Medical History:   Diagnosis Date    H. pylori infection     Hypertension     Kidney stones        Social History     Socioeconomic History    Marital status:    Tobacco Use    Smoking status: Never    Smokeless tobacco: Never   Substance and Sexual Activity    Alcohol use: No    Drug use: No    Sexual activity: Defer       Family History   Problem Relation Age of Onset    Cancer Mother     Heart disease Mother     Heart failure Mother     Hypertension Mother     Hyperlipidemia Mother     Heart disease Father        Review of Systems   Constitutional:  Positive for fatigue.   HENT: Negative.     Respiratory:  Positive for chest tightness and shortness of breath (Unchanged).    Cardiovascular:  Positive for chest pain (States he's been having off and on CP in the center of his chest. Mainly occurs at HS.) and palpitations. Negative for leg swelling.        States he can feel his heart beat in his left ear    Gastrointestinal: Negative.    Genitourinary:         Reccurrent kidney issues      Musculoskeletal:         States his feet get cold and legs get \"purple looking\"  " "  Neurological:  Positive for dizziness. Negative for weakness and numbness.   Hematological:  Does not bruise/bleed easily.   Psychiatric/Behavioral:  Positive for sleep disturbance (CP is messing w/ his sleep).        Objective     VITALS: /87   Pulse 56   Ht 177.8 cm (70\")   Wt 88.8 kg (195 lb 12.8 oz)   SpO2 97%   BMI 28.09 kg/m²     LABS:   Lab Results (most recent)       None            IMAGING:   No Images in the past 120 days found..    EXAM:  Constitutional:       Appearance: Normal appearance.   Eyes:      Pupils: Pupils are equal, round, and reactive to light.   Cardiovascular:      Rate and Rhythm: Bradycardia and regular rhythm.      Pulses:           Carotid pulses are 2+ on the right side and 2+ on the left side.       Radial pulses are 2+ on the right side and 2+ on the left side.        Dorsalis pedis pulses are 2+ on the right side and 2+ on the left side.        Posterior tibial pulses are 2+ on the right side and 2+ on the left side.      Heart sounds: Normal heart sounds.   Pulmonary:      Effort: Pulmonary effort is normal.      Breath sounds: Normal breath sounds.   Abdominal:      General: Bowel sounds are normal.      Palpations: Abdomen is soft.   Musculoskeletal:      Right lower leg: No edema.      Left lower leg: No edema.   Skin:     General: Skin is warm and dry.      Capillary Refill: Capillary refill takes less than 2 seconds.      Comments:  Neurological:      General: No focal deficit present.      Mental Status: He is alert and oriented to person, place, and time.   Psychiatric:         Mood and Affect: Mood normal.         Thought Content: Thought content normal.      Procedure     ECG 12 Lead    Date/Time: 9/12/2024 8:50 AM  Performed by: Ashley Abraham APRN    Authorized by: Ashley Abraham APRN  Comparison: compared with previous ECG from 9/11/2023  Similar to previous ECG  Rhythm: sinus bradycardia  Rate: bradycardic  BPM: 52  QRS axis: normal  Other " findings: non-specific ST-T wave changes             Assessment & Plan    Diagnosis Plan   1. Precordial pain  Stress Test With Myocardial Perfusion One Day    Adult Transthoracic Echo Complete W/ Cont if Necessary Per Protocol      2. Shortness of breath        3. Coronary artery disease involving native coronary artery of native heart without angina pectoris  ECG 12 Lead    aspirin 81 MG EC tablet    nitroglycerin (NITROSTAT) 0.4 MG SL tablet      4. Essential hypertension  amLODIPine (NORVASC) 5 MG tablet      5. Other fatigue        6. CHAYA (obstructive sleep apnea)  Ambulatory Referral to Pulmonology      Plan:  1.  Precordial pain: We will schedule the patient for nuclear stress test for ischemic evaluation and echocardiogram to evaluate LV function structural anatomy.  The patient does have sublingual nitroglycerin to use if needed.  We did review instructions on appropriate use and when to seek emergency treatment.  Will increase isosorbide to 60 mg daily.  2.  Shortness of breath: We will proceed with testing as discussed above.  3.  CAD: We will continue current medical management including aspirin, lisinopril, Imdur, rosuvastatin, amlodipine and Ranexa.  Will proceed with testing as above.  4.  Essential hypertension: Pressure is elevated in the office today.  The patient states it has not been running this elevated at home and he did not take medications this morning.  The patient was instructed to monitor BP at home and to notify our office if systolic BP is consistently greater than 130-135 systolic.  Goal BP is 130-135/70-80.  Due to chest pain symptoms we are increasing isosorbide to 60 mg daily.  Will continue to adjust antihypertensives as needed.  5.  Fatigue: We will proceed with cardiac testing as above.  Sleep study did show evidence of mild sleep apnea.  Will make referral to pulmonology to discuss possible CPAP.  6.  Obstructive sleep apnea: Will make referral to pulmonology to discuss  possible CPAP.    Return in about 2 months (around 2024).    Alfredo Lindsey  reports that he has never smoked. He has never used smokeless tobacco.       BMI is >= 25 and <30. (Overweight) The following options were offered after discussion;: referral to primary care       Advance Care Planning   ACP discussion was declined by the patient. Patient does not have an advance directive, declines further assistance.        MEDS ORDERED DURING VISIT:  New Medications Ordered This Visit   Medications    amLODIPine (NORVASC) 5 MG tablet     Sig: Take 1 tablet by mouth Daily.     Dispense:  90 tablet     Refill:  3    aspirin 81 MG EC tablet     Sig: Take 1 tablet by mouth Daily.     Dispense:  90 tablet     Refill:  3    isosorbide mononitrate (IMDUR) 60 MG 24 hr tablet     Sig: Take 1 tablet by mouth Daily.     Dispense:  90 tablet     Refill:  3    lisinopril (PRINIVIL,ZESTRIL) 10 MG tablet     Sig: Take 1 tablet by mouth Daily.     Dispense:  90 tablet     Refill:  3    nitroglycerin (NITROSTAT) 0.4 MG SL tablet     Si under the tongue as needed for angina, may repeat q5mins for up three doses     Dispense:  25 tablet     Refill:  6    ranolazine (RANEXA) 500 MG 12 hr tablet     Sig: Take 1 tablet by mouth 2 (Two) Times a Day.     Dispense:  90 tablet     Refill:  3    rosuvastatin (CRESTOR) 40 MG tablet     Sig: Take 1 tablet by mouth Daily.     Dispense:  90 tablet     Refill:  3       DISCONTINUED MEDS DURING VISIT:   Medications Discontinued During This Encounter   Medication Reason    nitroglycerin (NITROSTAT) 0.4 MG SL tablet Reorder    isosorbide mononitrate (IMDUR) 30 MG 24 hr tablet Reorder    amLODIPine (NORVASC) 5 MG tablet Reorder    rosuvastatin (CRESTOR) 40 MG tablet Reorder    lisinopril (PRINIVIL,ZESTRIL) 10 MG tablet Reorder    ranolazine (RANEXA) 500 MG 12 hr tablet Reorder    aspirin 81 MG EC tablet Reorder          This document has been electronically signed by Ashley Abraham  APRN  September 12, 2024 09:17 EDT    Dictated Utilizing Dragon Dictation: Part of this note may be an electronic transcription/translation of spoken language to printed text using the Dragon Dictation System

## 2024-09-13 ENCOUNTER — PRIOR AUTHORIZATION (OUTPATIENT)
Dept: CARDIOLOGY | Facility: CLINIC | Age: 69
End: 2024-09-13
Payer: MEDICARE

## 2024-09-13 NOTE — TELEPHONE ENCOUNTER
Prior authorization request received for Ranexa. Authorization submitted through Cover [employer] Med[employer]. Status pending.      Message from plan...  Approved today by [employer] 2017  PA Case: [id number], Status: Approved, Coverage Starts on: 6/14/2024 12:00:00 AM, Coverage Ends on: 9/13/2025

## 2025-03-14 ENCOUNTER — OFFICE VISIT (OUTPATIENT)
Dept: CARDIOLOGY | Facility: CLINIC | Age: 70
End: 2025-03-14
Payer: MEDICARE

## 2025-03-14 VITALS
BODY MASS INDEX: 27.77 KG/M2 | HEIGHT: 70 IN | SYSTOLIC BLOOD PRESSURE: 142 MMHG | HEART RATE: 68 BPM | DIASTOLIC BLOOD PRESSURE: 88 MMHG | WEIGHT: 194 LBS | OXYGEN SATURATION: 98 %

## 2025-03-14 DIAGNOSIS — I10 ESSENTIAL HYPERTENSION: ICD-10-CM

## 2025-03-14 DIAGNOSIS — G47.33 OSA (OBSTRUCTIVE SLEEP APNEA): ICD-10-CM

## 2025-03-14 DIAGNOSIS — I25.10 CORONARY ARTERY DISEASE INVOLVING NATIVE CORONARY ARTERY OF NATIVE HEART WITHOUT ANGINA PECTORIS: ICD-10-CM

## 2025-03-14 DIAGNOSIS — R06.02 SHORTNESS OF BREATH: ICD-10-CM

## 2025-03-14 DIAGNOSIS — R07.2 PRECORDIAL PAIN: Primary | ICD-10-CM

## 2025-03-14 DIAGNOSIS — R53.83 OTHER FATIGUE: ICD-10-CM

## 2025-03-14 PROCEDURE — 3079F DIAST BP 80-89 MM HG: CPT | Performed by: CLINICAL NURSE SPECIALIST

## 2025-03-14 PROCEDURE — 3077F SYST BP >= 140 MM HG: CPT | Performed by: CLINICAL NURSE SPECIALIST

## 2025-03-14 PROCEDURE — 99214 OFFICE O/P EST MOD 30 MIN: CPT | Performed by: CLINICAL NURSE SPECIALIST

## 2025-03-14 RX ORDER — RANOLAZINE 1000 MG/1
1000 TABLET, EXTENDED RELEASE ORAL 2 TIMES DAILY
Qty: 180 TABLET | Refills: 3 | Status: SHIPPED | OUTPATIENT
Start: 2025-03-14

## 2025-03-14 NOTE — PROGRESS NOTES
Subjective     Alfredo Lindsey is a 69 y.o. male who presents today for Follow-up (2mth- has not done testing ).    CHIEF COMPLIANT  Chief Complaint   Patient presents with    Follow-up     2mth- has not done testing        Active Problems:  1.  Coronary artery disease: Cardiac catheterization 9/12/2023 main: Normal, LAD mid 60% FFR 0.85, D1 mid vessel 70% FFR 0.82, LCx: Dominant, PDA branch mid vessel 70% FFR 0.89 RCA: Normal, nondominant LDG: Normal, EF 60%  2.  Echocardiogram 9/11/2023 bradycardia with heart rate 43 bpm, normal LV systolic and diastolic function EF 60%, no significant valvular abnormalities  3.  Hypertension  4.  Hyperlipidemia  5.  Sinus bradycardia  6.  Event monitor: Predominant rhythm was sinus bradycardia.  The patient did have 2 patient activated events both were sinus bradycardia.  With rate 56 to 59 bpm.  There were a couple episodes of sinus tachycardia 1 with a max heart rate up to 161 the other with a heart rate of 142.     HPI  The patient is a 69-year-old male that returns for follow-up.  At his last visit he was having intermittent chest pain which radiates down to his left wrist. He was also having some increased dyspnea.  He was scheduled for a stress and echo and isosorbide was increased to 60mg daily. He states he never received a call to schedule testing.  He was unable to tolerate the isosorbide due to dizziness and stopped taking this.  In November he developed retinal hemorrhage and has had a significant impact to his vision.  He is following with the retinal specialist and getting injections.  The patient states his chest pain symptoms have improved a little but have not resolved.  He is still having intermittent chest pain that radiates down his left wrist with associated dyspnea.  He would like to have testing rescheduled.    PRIOR MEDS  Current Outpatient Medications on File Prior to Visit   Medication Sig Dispense Refill    amLODIPine (NORVASC) 5 MG tablet Take 1 tablet  "by mouth Daily. 90 tablet 3    aspirin 81 MG EC tablet Take 1 tablet by mouth Daily. 90 tablet 3    lisinopril (PRINIVIL,ZESTRIL) 10 MG tablet Take 1 tablet by mouth Daily. 90 tablet 3    nitroglycerin (NITROSTAT) 0.4 MG SL tablet 1 under the tongue as needed for angina, may repeat q5mins for up three doses 25 tablet 6    pantoprazole (PROTONIX) 40 MG EC tablet Take 1 tablet by mouth Daily.      rosuvastatin (CRESTOR) 40 MG tablet Take 1 tablet by mouth Daily. 90 tablet 3    sertraline (ZOLOFT) 50 MG tablet Take 1 tablet by mouth Daily.      [DISCONTINUED] ranolazine (RANEXA) 500 MG 12 hr tablet Take 1 tablet by mouth 2 (Two) Times a Day. 90 tablet 3    [DISCONTINUED] isosorbide mononitrate (IMDUR) 60 MG 24 hr tablet Take 1 tablet by mouth Daily. 90 tablet 3     No current facility-administered medications on file prior to visit.       ALLERGIES  Imdur [isosorbide nitrate]    HISTORY  Past Medical History:   Diagnosis Date    H. pylori infection     Hypertension     Kidney stones     Macular degeneration     Retinal hemorrhage        Social History     Socioeconomic History    Marital status:    Tobacco Use    Smoking status: Never    Smokeless tobacco: Never   Substance and Sexual Activity    Alcohol use: No    Drug use: No    Sexual activity: Defer       Family History   Problem Relation Age of Onset    Cancer Mother     Heart disease Mother     Heart failure Mother     Hypertension Mother     Hyperlipidemia Mother     Heart disease Father        Review of Systems   Constitutional:  Positive for fatigue.   Eyes:  Positive for visual disturbance (Retinal hemorrahoging- getting shots in his eyes as he just sees \"fog\").   Respiratory:  Positive for shortness of breath (w/ normal daily activity). Negative for chest tightness.    Cardiovascular:  Positive for chest pain (Occasionally sharp pain- left sided). Negative for palpitations and leg swelling.        Pt and spouse state he was never given appt for " "testing.    Neurological:  Negative for dizziness (Was dizzy w/ Imdur, so he stopped it. denies dizziness currently), syncope, weakness, numbness and headaches.   Hematological:  Does not bruise/bleed easily.   Psychiatric/Behavioral:  Negative for sleep disturbance.        Objective     VITALS: /88   Pulse 68   Ht 177.8 cm (70\")   Wt 88 kg (194 lb)   SpO2 98%   BMI 27.84 kg/m²     LABS:   Lab Results (most recent)       None            IMAGING:   No Images in the past 120 days found..    EXAM:  Constitutional:       Appearance: Normal appearance.   Cardiovascular:      Rate and Rhythm: Bradycardia and regular rhythm.      Pulses:           Carotid pulses are 2+ on the right side and 2+ on the left side.       Radial pulses are 2+ on the right side and 2+ on the left side.        Dorsalis pedis pulses are 2+ on the right side and 2+ on the left side.        Posterior tibial pulses are 2+ on the right side and 2+ on the left side.      Heart sounds: Normal heart sounds.   Pulmonary:      Effort: Pulmonary effort is normal.      Breath sounds: Normal breath sounds.   Abdominal:      General: Bowel sounds are normal.      Palpations: Abdomen is soft.   Musculoskeletal:      Right lower leg: No edema.      Left lower leg: No edema.   Skin:     General: Skin is warm and dry.      Capillary Refill: Capillary refill takes less than 2 seconds.      Comments:  Neurological:      General: No focal deficit present.      Mental Status: He is alert and oriented to person, place, and time.   Psychiatric:         Mood and Affect: Mood normal.         Thought Content: Thought content normal.   Copied information reviewed.  No change in assessment    Procedure   Procedures       Assessment & Plan    Diagnosis Plan   1. Precordial pain  Stress Test With Myocardial Perfusion One Day    Adult Transthoracic Echo Complete W/ Cont if Necessary Per Protocol      2. Shortness of breath  Stress Test With Myocardial Perfusion One " Day    Adult Transthoracic Echo Complete W/ Cont if Necessary Per Protocol      3. Coronary artery disease involving native coronary artery of native heart without angina pectoris        4. Essential hypertension        5. Other fatigue        6. CHAYA (obstructive sleep apnea)          Plan:  1.  Precordial pain: Will reschedule the patient for nuclear stress test for ischemic evaluation and echocardiogram to evaluate LV function structural anatomy.  The patient does have sublingual nitroglycerin to use if needed.  We did review instructions on appropriate use and when to seek emergency treatment.  He was unable to tolerate isosorbide but will increase Ranexa to 1000 mg p.o. twice daily.    2.  Shortness of breath: We will proceed with testing as discussed above.  3.  CAD: We will continue current medical management including aspirin, lisinopril, Imdur, rosuvastatin, amlodipine and Ranexa.  Will proceed with testing as above.  4.  Essential hypertension: Blood pressure slightly elevated in the office.  The patient states it has not been running this elevated at home and he did not take medications this morning.  The patient was instructed to monitor BP at home and to notify our office if systolic BP is consistently greater than 130-135 systolic.  Goal BP is 130-135/70-80.    Will continue to adjust antihypertensives as needed.  5.  Fatigue: We will proceed with cardiac testing as above.    6.  Obstructive sleep apnea: We made referral to pulmonology to discuss possible CPAP but unfortunately the patient states he will not be able to tolerate CPAP.  We have discussed the negative effects that untreated sleep apnea can have on his health, specifically his heart.    No follow-ups on file.    Alfredo Cheematree  reports that he has never smoked. He has never used smokeless tobacco.      Advance Care Planning  ACP discussion was declined by the patient. Patient does not have an advance directive, declines further  assistance.    MEDS ORDERED DURING VISIT:  New Medications Ordered This Visit   Medications    ranolazine (RANEXA) 1000 MG 12 hr tablet     Sig: Take 1 tablet by mouth 2 (Two) Times a Day.     Dispense:  180 tablet     Refill:  3       DISCONTINUED MEDS DURING VISIT:   Medications Discontinued During This Encounter   Medication Reason    ranolazine (RANEXA) 500 MG 12 hr tablet Reorder    isosorbide mononitrate (IMDUR) 60 MG 24 hr tablet           This document has been electronically signed by OLIVIA Moreno  March 14, 2025 09:52 EDT    Dictated Utilizing Dragon Dictation: Part of this note may be an electronic transcription/translation of spoken language to printed text using the Dragon Dictation System

## 2025-04-21 ENCOUNTER — HOSPITAL ENCOUNTER (OUTPATIENT)
Dept: CARDIOLOGY | Facility: HOSPITAL | Age: 70
Discharge: HOME | End: 2025-04-21
Admitting: CLINICAL NURSE SPECIALIST
Payer: MEDICARE

## 2025-04-21 ENCOUNTER — APPOINTMENT (OUTPATIENT)
Dept: CARDIOLOGY | Facility: HOSPITAL | Age: 70
End: 2025-04-21
Payer: MEDICARE

## 2025-04-21 DIAGNOSIS — R07.2 PRECORDIAL PAIN: ICD-10-CM

## 2025-04-21 DIAGNOSIS — R06.02 SHORTNESS OF BREATH: ICD-10-CM

## 2025-04-21 LAB
ASCENDING AORTA: 4.2 CM
AV MEAN PRESS GRAD SYS DOP V1V2: 2.9 MMHG
AV VMAX SYS DOP: 104.5 CM/SEC
BH CV ECHO MEAS - ACS: 2.19 CM
BH CV ECHO MEAS - AO MAX PG: 4.4 MMHG
BH CV ECHO MEAS - AO ROOT DIAM: 3.5 CM
BH CV ECHO MEAS - AO V2 VTI: 22.8 CM
BH CV ECHO MEAS - EDV(CUBED): 51.9 ML
BH CV ECHO MEAS - EDV(MOD-SP4): 109 ML
BH CV ECHO MEAS - EF(MOD-SP4): 55.4 %
BH CV ECHO MEAS - ESV(CUBED): 12 ML
BH CV ECHO MEAS - ESV(MOD-SP4): 48.6 ML
BH CV ECHO MEAS - FS: 38.6 %
BH CV ECHO MEAS - IVS/LVPW: 1.24 CM
BH CV ECHO MEAS - IVSD: 1.78 CM
BH CV ECHO MEAS - LA DIMENSION: 3.7 CM
BH CV ECHO MEAS - LAT PEAK E' VEL: 5.5 CM/SEC
BH CV ECHO MEAS - LV DIASTOLIC VOL/BSA (35-75): 52.9 CM2
BH CV ECHO MEAS - LV MASS(C)D: 236.7 GRAMS
BH CV ECHO MEAS - LV SYSTOLIC VOL/BSA (12-30): 23.6 CM2
BH CV ECHO MEAS - LVIDD: 3.7 CM
BH CV ECHO MEAS - LVIDS: 2.29 CM
BH CV ECHO MEAS - LVPWD: 1.44 CM
BH CV ECHO MEAS - MED PEAK E' VEL: 3.5 CM/SEC
BH CV ECHO MEAS - MV A MAX VEL: 78.4 CM/SEC
BH CV ECHO MEAS - MV DEC TIME: 0.56 SEC
BH CV ECHO MEAS - MV E MAX VEL: 40.7 CM/SEC
BH CV ECHO MEAS - MV E/A: 0.52
BH CV ECHO MEAS - RAP SYSTOLE: 10 MMHG
BH CV ECHO MEAS - RVDD: 3.1 CM
BH CV ECHO MEAS - RVSP: 23.2 MMHG
BH CV ECHO MEAS - SV(MOD-SP4): 60.4 ML
BH CV ECHO MEAS - SVI(MOD-SP4): 29.3 ML/M2
BH CV ECHO MEAS - TR MAX PG: 13.2 MMHG
BH CV ECHO MEAS - TR MAX VEL: 181.9 CM/SEC
BH CV ECHO MEASUREMENTS AVERAGE E/E' RATIO: 9.04
IVRT: 144 MS
LEFT ATRIUM VOLUME INDEX: 23.5 ML/M2
LV EF 3D SEGMENTATION: 72 %

## 2025-04-21 PROCEDURE — 93306 TTE W/DOPPLER COMPLETE: CPT | Performed by: SPECIALIST

## 2025-04-21 PROCEDURE — 93306 TTE W/DOPPLER COMPLETE: CPT

## 2025-07-07 RX ORDER — ROSUVASTATIN CALCIUM 40 MG/1
40 TABLET, COATED ORAL DAILY
Qty: 90 TABLET | Refills: 3 | Status: SHIPPED | OUTPATIENT
Start: 2025-07-07

## 2025-07-24 ENCOUNTER — TELEPHONE (OUTPATIENT)
Dept: CARDIOLOGY | Facility: CLINIC | Age: 70
End: 2025-07-24
Payer: MEDICARE

## 2025-07-24 NOTE — TELEPHONE ENCOUNTER
Pt was seen a [facility] for chest pain, pt missed testing we had scheduled due to being sick and vomiting.